# Patient Record
Sex: MALE | Race: WHITE | RURAL
[De-identification: names, ages, dates, MRNs, and addresses within clinical notes are randomized per-mention and may not be internally consistent; named-entity substitution may affect disease eponyms.]

---

## 2017-01-30 ENCOUNTER — OFFICE VISIT (OUTPATIENT)
Dept: FAMILY MEDICINE CLINIC | Age: 22
End: 2017-01-30

## 2017-01-30 VITALS
SYSTOLIC BLOOD PRESSURE: 128 MMHG | OXYGEN SATURATION: 98 % | TEMPERATURE: 97.5 F | DIASTOLIC BLOOD PRESSURE: 84 MMHG | WEIGHT: 248 LBS | HEIGHT: 73 IN | RESPIRATION RATE: 16 BRPM | BODY MASS INDEX: 32.87 KG/M2 | HEART RATE: 80 BPM

## 2017-01-30 DIAGNOSIS — R68.89 FLU-LIKE SYMPTOMS: ICD-10-CM

## 2017-01-30 DIAGNOSIS — J06.9 VIRAL UPPER RESPIRATORY TRACT INFECTION: ICD-10-CM

## 2017-01-30 DIAGNOSIS — M79.10 MYALGIA: ICD-10-CM

## 2017-01-30 DIAGNOSIS — J02.9 SORE THROAT: Primary | ICD-10-CM

## 2017-01-30 DIAGNOSIS — F43.23 ADJUSTMENT DISORDER WITH MIXED ANXIETY AND DEPRESSED MOOD: ICD-10-CM

## 2017-01-30 LAB
BINAX NOW INFLUENZA: NEGATIVE
S PYO AG THROAT QL: NEGATIVE
VALID INTERNAL CONTROL?: YES
VALID INTERNAL CONTROL?: YES

## 2017-01-30 RX ORDER — SERTRALINE HYDROCHLORIDE 50 MG/1
TABLET, FILM COATED ORAL
Qty: 90 TAB | Refills: 3 | Status: SHIPPED | OUTPATIENT
Start: 2017-01-30 | End: 2017-08-18

## 2017-01-30 RX ORDER — PROMETHAZINE HYDROCHLORIDE AND CODEINE PHOSPHATE 6.25; 1 MG/5ML; MG/5ML
5 SOLUTION ORAL
Qty: 120 ML | Refills: 1 | Status: SHIPPED | OUTPATIENT
Start: 2017-01-30 | End: 2017-08-18

## 2017-01-30 RX ORDER — LIDOCAINE HYDROCHLORIDE 20 MG/ML
SOLUTION OROPHARYNGEAL
Qty: 1 BOTTLE | Refills: 1 | Status: SHIPPED | OUTPATIENT
Start: 2017-01-30 | End: 2017-08-18

## 2017-01-30 NOTE — MR AVS SNAPSHOT
Visit Information Date & Time Provider Department Dept. Phone Encounter #  
 1/30/2017 10:15 AM Aileen Royal MD José [address] [place] [address] [place] [id number] Follow-up Instructions Return in about 6 months (around 7/30/2017). Follow-up and Disposition History Upcoming Health Maintenance Date Due DTaP/Tdap/Td series (1 - Tdap) 6/15/2015 INFLUENZA AGE 9 TO ADULT 8/1/2016 Allergies as of 1/30/2017  Review Complete On: 1/30/2017 By: Karin Cardona LPN No Known Allergies Current Immunizations  Never Reviewed No immunizations on file. Not reviewed this visit You Were Diagnosed With   
  
 Codes Comments Sore throat    -  Primary ICD-10-CM: J02.9 ICD-9-CM: 814 Adjustment disorder with mixed anxiety and depressed mood     ICD-10-CM: F43.23 
ICD-9-CM: 309.28 Myalgia     ICD-10-CM: M79.1 ICD-9-CM: 729.1 Flu-like symptoms     ICD-10-CM: R68.89 ICD-9-CM: 780.99 Viral upper respiratory tract infection     ICD-10-CM: J06.9, B97.89 ICD-9-CM: 465.9 Vitals BP Pulse Temp Resp Height(growth percentile) Weight(growth percentile) 128/84 80 97.5 °F (36.4 °C) (Oral) 16 6' 1\" (1.854 m) 248 lb (112.5 kg) SpO2 BMI Smoking Status 98% 32.72 kg/m2 Former Smoker BMI and BSA Data Body Mass Index Body Surface Area 32.72 kg/m 2 2.41 m 2 Preferred Pharmacy Pharmacy Name Phone [id number], [address] [facility] [place] [phone] Your Updated Medication List  
  
   
This list is accurate as of: 1/30/17 12:37 PM.  Always use your most recent med list.  
  
  
  
  
 lidocaine 2 % solution Commonly known as:  XYLOCAINE 5ml in 1 cup warm water, gargle and spit QID prn sore throat  
  
 promethazine-codeine 6.25-10 mg/5 mL syrup Commonly known as:  PHENERGAN with CODEINE Take 5 mL by mouth four (4) times daily as needed for Cough.  Max Daily Amount: 20 mL. sertraline 50 mg tablet Commonly known as:  ZOLOFT  
1/2 tab daily for 6 days, then whole tab daily for nerves  Indications: nerves Prescriptions Printed Refills  
 promethazine-codeine (PHENERGAN WITH CODEINE) 6.25-10 mg/5 mL syrup 1 Sig: Take 5 mL by mouth four (4) times daily as needed for Cough. Max Daily Amount: 20 mL. Class: Print Route: Oral  
  
Prescriptions Sent to Pharmacy Refills  
 sertraline (ZOLOFT) 50 mg tablet 3 Si/2 tab daily for 6 days, then whole tab daily for nerves  Indications: nerves Class: Normal  
 Pharmacy: [facility]/pharmacy #[id number] - [place] - [address] Ph #: [phone]  
 lidocaine (XYLOCAINE) 2 % solution 1 Siml in 1 cup warm water, gargle and spit QID prn sore throat Class: Normal  
 Pharmacy: [facility]/pharmacy #[id number] Abdiaziz Rowe, [address] [place] [address] [address] [place] Ph #: [phone] We Performed the Following AMB POC BINAX NOW INFLUENZA TEST [35262 CPT(R)] AMB POC RAPID STREP A [59816 CPT(R)] Follow-up Instructions Return in about 6 months (around 2017). Introducing [facility]! [employer] introduces Voxa patient portal. Now you can access parts of your medical record, email your doctor's office, and request medication refills online. 1. In your internet browser, go to [email or web address] 2. Click on the First Time User? Click Here link in the Sign In box. You will see the New Member Sign Up page. 3. Enter your Voxa Access Code exactly as it appears below. You will not need to use this code after youve completed the sign-up process. If you do not sign up before the expiration date, you must request a new code. · Voxa Access Code: [email or web address] Expires: 2017 12:37 PM 
 
4.  Enter the last four digits of your Social Security Number (xxxx) and Date of Birth (mm/dd/yyyy) as indicated and click Submit. You will be taken to the next sign-up page. 5. Create a The Paper Store ID. This will be your The Paper Store login ID and cannot be changed, so think of one that is secure and easy to remember. 6. Create a The Paper Store password. You can change your password at any time. 7. Enter your Password Reset Question and Answer. This can be used at a later time if you forget your password. 8. Enter your e-mail address. You will receive e-mail notification when new information is available in [address] 9. Click Sign Up. You can now view and download portions of your medical record. 10. Click the Download Summary menu link to download a portable copy of your medical information. If you have questions, please visit the Frequently Asked Questions section of the The Paper Store website. Remember, The Paper Store is NOT to be used for urgent needs. For medical emergencies, dial 911. Now available from your iPhone and Android! Please provide this summary of care documentation to your next provider. Your primary care clinician is listed as Marce Kwon. If you have any questions after today's visit, please call [phone].

## 2017-01-30 NOTE — PROGRESS NOTES
Ariel Draper is a 24 y.o. male presenting for/with:    Cough; Cold Symptoms; URI; and Sore Throat    HPI:  Above sx since 3d ago. Fatigued. Now with nausea and loose stool off and on. Felt a lot worse this AM, felt like he was very achy this AM all over. Didn't get flu shot this yr. F/U depression. Doing well since last visit. Sleeping well. Still living in apartment in [facility]. Working at [employer] in [place] now, happy with that. Has new job. Planning to r/s [employer] courses this spring or summer. Interests remain normal. Vinnie of zoloft 50 1/2 BID. Energy: good. Concentration: good. Appetite: normal, not increased. Safety: no thoughts of being better off dead lately. PHQ 2 / 9, over the last two weeks 1/30/2017   Little interest or pleasure in doing things Not at all   Feeling down, depressed or hopeless Not at all   Total Score PHQ 2 0     PMH, SH, Medications/Allergies: reviewed, on chart. Drinking sometimes, has 2-3 beers/day. ROS:  Constitutional: No fever, chills or weight loss  Respiratory: No cough, SOB   CV: No chest pain or Palpitations    Visit Vitals    /84    Pulse 80    Temp 97.5 °F (36.4 °C) (Oral)    Resp 16    Ht 6' 1\" (1.854 m)    Wt 248 lb (112.5 kg)    SpO2 98%    BMI 32.72 kg/m2     Wt Readings from Last 3 Encounters:   01/30/17 248 lb (112.5 kg)   07/21/16 240 lb (108.9 kg)   06/01/16 238 lb (108 kg)     Physical Examination: General appearance - alert, well appearing, and in no distress  Mental status - alert, oriented to person, place, and time  Eyes - pupils equal and reactive, extraocular eye movements intact  ENT - bilateral external ears and nose normal. Normal lips. OP with mod erythema and mildly enlarged tonsils. Neck - supple, no significant adenopathy, no thyromegaly or mass  CV: RRR no M/R/G. Nl PMI. Resp: LCTA B, nl resp effort. Psychiatric: Mood: \"good;\" Affect: variable, appropriate;  Thoughts: logical; Speech: RRR; Sensorium: No AV hallucinations; Safety: no SI/HI    Labs:  Strep: NEG  Flu: NEG    A/P:   Flu like symptoms  Tests reassuring. Monitor, OTC analgesics PRN. Phenergan AC PRN cough. Lidocaine gargle PRN sore throat. Adjustment disorder with mixed affect, well controlled. Con't zoloft 25mg BID.      F/U 6mo

## 2017-08-18 ENCOUNTER — OFFICE VISIT (OUTPATIENT)
Dept: FAMILY MEDICINE CLINIC | Age: 22
End: 2017-08-18

## 2017-08-18 VITALS
BODY MASS INDEX: 33.48 KG/M2 | TEMPERATURE: 98.2 F | HEART RATE: 64 BPM | RESPIRATION RATE: 17 BRPM | WEIGHT: 253.8 LBS | SYSTOLIC BLOOD PRESSURE: 120 MMHG | DIASTOLIC BLOOD PRESSURE: 75 MMHG | OXYGEN SATURATION: 98 %

## 2017-08-18 DIAGNOSIS — F43.23 ADJUSTMENT DISORDER WITH MIXED ANXIETY AND DEPRESSED MOOD: Primary | ICD-10-CM

## 2017-08-18 NOTE — MR AVS SNAPSHOT
Visit Information Date & Time Provider Department Dept. Phone Encounter #  
 8/18/2017  1:40 PM Lakisha Haque MD [facility] [place] [id number] Follow-up Instructions Return if symptoms worsen or fail to improve. Follow-up and Disposition History Upcoming Health Maintenance Date Due INFLUENZA AGE 9 TO ADULT 12/15/2017* DTaP/Tdap/Td series (2 - Td) 8/18/2027 *Topic was postponed. The date shown is not the original due date. Allergies as of 8/18/2017  Review Complete On: 8/18/2017 By: Lakisha Haque MD  
 No Known Allergies Current Immunizations  Reviewed on 8/18/2017 No immunizations on file. Reviewed by Gaviota Baker on 8/18/2017 at  1:39 PM  
You Were Diagnosed With   
  
 Codes Comments Adjustment disorder with mixed anxiety and depressed mood    -  Primary ICD-10-CM: F43.23 
ICD-9-CM: 309.28 Vitals BP Pulse Temp Resp Weight(growth percentile) SpO2  
 120/75 64 98.2 °F (36.8 °C) (Oral) 17 253 lb 12.8 oz (115.1 kg) 98% BMI Smoking Status 33.48 kg/m2 Former Smoker Vitals History BMI and BSA Data Body Mass Index Body Surface Area  
 33.48 kg/m 2 2.43 m 2 Preferred Pharmacy [facility] Name Phone [id number] [address] [place] [phone] Your Updated Medication List  
  
Notice  As of 8/18/2017  2:24 PM  
 You have not been prescribed any medications. Follow-up Instructions Return if symptoms worsen or fail to improve. Introducing [facility]! Zaida Grant introduces Cympel patient portal. Now you can access parts of your medical record, email your doctor's office, and request medication refills online. 1. In your internet browser, go to [email or web address] 2. Click on the First Time User? Click Here link in the Sign In box.  You will see the New Member Sign Up page. 3. Enter your XMPie Access Code exactly as it appears below. You will not need to use this code after youve completed the sign-up process. If you do not sign up before the expiration date, you must request a new code. · XMPie Access Code: [email or web address] Expires: 11/16/2017  1:29 PM 
 
4. Enter the last four digits of your Social Security Number (xxxx) and Date of Birth (mm/dd/yyyy) as indicated and click Submit. You will be taken to the next sign-up page. 5. Create a Eventus Software Pvtt ID. This will be your XMPie login ID and cannot be changed, so think of one that is secure and easy to remember. 6. Create a XMPie password. You can change your password at any time. 7. Enter your Password Reset Question and Answer. This can be used at a later time if you forget your password. 8. Enter your e-mail address. You will receive e-mail notification when new information is available in [address] 9. Click Sign Up. You can now view and download portions of your medical record. 10. Click the Download Summary menu link to download a portable copy of your medical information. If you have questions, please visit the Frequently Asked Questions section of the XMPie website. Remember, XMPie is NOT to be used for urgent needs. For medical emergencies, dial 911. Now available from your iPhone and Android! Please provide this summary of care documentation to your next provider. Your primary care clinician is listed as Antony Kwon. If you have any questions after today's visit, please call [phone].

## 2017-08-18 NOTE — PROGRESS NOTES
Heather Skaggs is a 20 y.o. male presenting for/with:    Muscle Pain (Neck pain, headache, muscles aches all over body. )    HPI:  Above sx since 5d ago, woke up sick. Took a break. Better with APAP, advil. Improved after a couple days. Mostly back to normal, just needs clearance to return to work. F/U depression. Doing well since last visit. Sleeping well. Still living in apartment in [place]. Still working at [employer] in [facility] now, still happy with that. Planning to r/s [employer] correspondence courses this spring or summer. Interests remain normal. Off zoloft now. Concentration: good. Appetite: normal, not increased. Safety: no thoughts of being better off dead lately. PHQ over the last two weeks 8/18/2017   PHQ Not Done -   Little interest or pleasure in doing things Not at all   Feeling down, depressed or hopeless Not at all   Total Score PHQ 2 0     PMH, SH, Medications/Allergies: reviewed, on chart. Drinking sometimes, has 2-3 beers/day. ROS:  Constitutional: No fever, chills or weight loss  Respiratory: No cough, SOB   CV: No chest pain or Palpitations    Visit Vitals    /75    Pulse 64    Temp 98.2 °F (36.8 °C) (Oral)    Resp 17    Wt 253 lb 12.8 oz (115.1 kg)    SpO2 98%    BMI 33.48 kg/m2     Wt Readings from Last 3 Encounters:   08/18/17 253 lb 12.8 oz (115.1 kg)   01/30/17 248 lb (112.5 kg)   07/21/16 240 lb (108.9 kg)     Physical Examination: General appearance - alert, well appearing, and in no distress  Mental status - alert, oriented to person, place, and time  Eyes - pupils equal and reactive, extraocular eye movements intact  ENT - bilateral external ears and nose normal. Normal lips. OP with mod erythema and mildly enlarged tonsils. Neck - supple, no significant adenopathy, no thyromegaly or mass  CV: RRR no M/R/G. Nl PMI. Resp: LCTA B, nl resp effort. Psychiatric: Mood: \"good;\" Affect: variable, appropriate;  Thoughts: logical; Speech: RRR; Sensorium: No AV hallucinations; Safety: no SI/HI    A/P:   URI  Resolved. Fit for full duty    Adjustment disorder with mixed affect, well controlled. Resolved. Monitor.      F/U PRN

## 2017-08-18 NOTE — LETTER
8/18/2017 2:20 PM 
Brittaney Camargo MD 
[address] [place] [address],[address] [address] [phone] Re: Mr. Ade Starr [facility] [address] PT was seen in office today and is fit for full duty. Sincerely, Brittaney Camargo MD

## 2018-03-01 ENCOUNTER — OFFICE VISIT (OUTPATIENT)
Dept: FAMILY MEDICINE CLINIC | Age: 23
End: 2018-03-01

## 2018-03-01 VITALS
BODY MASS INDEX: 33.32 KG/M2 | OXYGEN SATURATION: 98 % | RESPIRATION RATE: 19 BRPM | SYSTOLIC BLOOD PRESSURE: 145 MMHG | WEIGHT: 251.4 LBS | DIASTOLIC BLOOD PRESSURE: 92 MMHG | TEMPERATURE: 98.2 F | HEART RATE: 62 BPM | HEIGHT: 73 IN

## 2018-03-01 DIAGNOSIS — R09.81 SINUS CONGESTION: ICD-10-CM

## 2018-03-01 DIAGNOSIS — J11.1 INFLUENZA: Primary | ICD-10-CM

## 2018-03-01 RX ORDER — DEXAMETHASONE 6 MG/1
TABLET ORAL
Qty: 2 TAB | Refills: 0 | Status: SHIPPED | OUTPATIENT
Start: 2018-03-01 | End: 2020-03-03 | Stop reason: ALTCHOICE

## 2018-03-01 RX ORDER — OSELTAMIVIR PHOSPHATE 75 MG/1
75 CAPSULE ORAL 2 TIMES DAILY
Qty: 10 CAP | Refills: 0 | Status: SHIPPED | OUTPATIENT
Start: 2018-03-01 | End: 2018-03-06

## 2018-03-01 NOTE — PATIENT INSTRUCTIONS

## 2018-03-01 NOTE — MR AVS SNAPSHOT
Kaycee Craig 
 
 
 [address] [facility] [phone] Patient: Bhumi Song MRN: [id number] FAS:[birth year] Visit Information Date & Time Provider Department Dept. Phone Encounter #  
 3/1/2018  9:30 AM Alexia Hill NP [address] [place] [id number] Follow-up Instructions Return if symptoms worsen or fail to improve. Follow-up and Disposition History Upcoming Health Maintenance Date Due Influenza Age 5 to Adult 8/1/2017 DTaP/Tdap/Td series (2 - Td) 8/18/2027 Allergies as of 3/1/2018  Review Complete On: 3/1/2018 By: Alexia Hill NP No Known Allergies Current Immunizations  Reviewed on 8/18/2017 No immunizations on file. Not reviewed this visit You Were Diagnosed With   
  
 Codes Comments Influenza    -  Primary ICD-10-CM: J11.1 ICD-9-CM: 732.8 Sinus congestion     ICD-10-CM: R09.81 ICD-9-CM: 478.19 Vitals BP Pulse Temp Resp Height(growth percentile) Weight(growth percentile) (!) 145/92 (BP 1 Location: Left arm, BP Patient Position: Sitting) 62 98.2 °F (36.8 °C) (Oral) 19 6' 1\" (1.854 m) 251 lb 6.4 oz (114 kg) SpO2 BMI Smoking Status 98% 33.17 kg/m2 Former Smoker BMI and BSA Data Body Mass Index Body Surface Area  
 33.17 kg/m 2 2.42 m 2 Preferred Pharmacy Pharmacy Name Phone [id number] Lynda Vera [facility] [place] [phone] Your Updated Medication List  
  
   
This list is accurate as of 3/1/18 11:51 AM.  Always use your most recent med list.  
  
  
  
  
 dexamethasone 6 mg tablet Commonly known as:  DECADRON Take 2 tabs PO today with food  
  
 oseltamivir 75 mg capsule Commonly known as:  TAMIFLU Take 1 Cap by mouth two (2) times a day for 5 days. Indications: INFLUENZA Prescriptions Sent to Pharmacy Refills dexamethasone (DECADRON) 6 mg tablet 0 Sig: Take 2 tabs PO today with food Class: Normal  
 Pharmacy: [facility]/pharmacy #[id number] - [place] [address] [place] Ph #: [phone]  
 oseltamivir (TAMIFLU) 75 mg capsule 0 Sig: Take 1 Cap by mouth two (2) times a day for 5 days. Indications: INFLUENZA Class: Normal  
 Pharmacy: [facility]/pharmacy #[id number] [address] [place] Ph #: [phone] Route: Oral  
  
Follow-up Instructions Return if symptoms worsen or fail to improve. Patient Instructions Influenza (Flu): Care Instructions Your Care Instructions Influenza (flu) is an infection in the lungs and breathing passages. It is caused by the influenza virus. There are different strains, or types, of the flu virus from year to year. Unlike the common cold, the flu comes on suddenly and the symptoms, such as a cough, congestion, fever, chills, fatigue, aches, and pains, are more severe. These symptoms may last up to 10 days. Although the flu can make you feel very sick, it usually doesn't cause serious health problems. Home treatment is usually all you need for flu symptoms. But your doctor may prescribe antiviral medicine to prevent other health problems, such as pneumonia, from developing. Older people and those who have a long-term health condition, such as lung disease, are most at risk for having pneumonia or other health problems. Follow-up care is a key part of your treatment and safety. Be sure to make and go to all appointments, and call your doctor if you are having problems. It's also a good idea to know your test results and keep a list of the medicines you take. How can you care for yourself at home? · Get plenty of rest. 
· Drink plenty of fluids, enough so that your urine is light yellow or clear like water.  If you have kidney, heart, or liver disease and have to limit fluids, talk with your doctor before you increase the amount of fluids you drink. · Take an over-the-counter pain medicine if needed, such as acetaminophen (Tylenol), ibuprofen (Advil, Motrin), or naproxen (Aleve), to relieve fever, headache, and muscle aches. Read and follow all instructions on the label. No one younger than 20 should take aspirin. It has been linked to Reye syndrome, a serious illness. · Do not smoke. Smoking can make the flu worse. If you need help quitting, talk to your doctor about stop-smoking programs and medicines. These can increase your chances of quitting for good. · Breathe moist air from a hot shower or from a sink filled with hot water to help clear a stuffy nose. · Before you use cough and cold medicines, check the label. These medicines may not be safe for young children or for people with certain health problems. · If the skin around your nose and lips becomes sore, put some petroleum jelly on the area. · To ease coughing: ¨ Drink fluids to soothe a scratchy throat. ¨ Suck on cough drops or plain hard candy. ¨ Take an over-the-counter cough medicine that contains dextromethorphan to help you get some sleep. Read and follow all instructions on the label. ¨ Raise your head at night with an extra pillow. This may help you rest if coughing keeps you awake. · Take any prescribed medicine exactly as directed. Call your doctor if you think you are having a problem with your medicine. To avoid spreading the flu · Wash your hands regularly, and keep your hands away from your face. · Stay home from school, work, and other public places until you are feeling better and your fever has been gone for at least 24 hours. The fever needs to have gone away on its own without the help of medicine. · Ask people living with you to talk to their doctors about preventing the flu. They may get antiviral medicine to keep from getting the flu from you. · To prevent the flu in the future, get a flu vaccine every fall. Encourage people living with you to get the vaccine. · Cover your mouth when you cough or sneeze. When should you call for help? Call 911 anytime you think you may need emergency care. For example, call if: 
? · You have severe trouble breathing. ?Call your doctor now or seek immediate medical care if: 
? · You have new or worse trouble breathing. ? · You seem to be getting much sicker. ? · You feel very sleepy or confused. ? · You have a new or higher fever. ? · You get a new rash. ? Watch closely for changes in your health, and be sure to contact your doctor if: 
? · You begin to get better and then get worse. ? · You are not getting better after 1 week. Where can you learn more? Go to http://malgorzata-mikael.info/. Enter B527 in the search box to learn more about \"Influenza (Flu): Care Instructions. \" Current as of: May 12, 2017 Content Version: 11.4 © 0500-0264 Acsis. Care instructions adapted under license by [employer] (which disclaims liability or warranty for this information). If you have questions about a medical condition or this instruction, always ask your healthcare professional. [address] any warranty or liability for your use of this information. If you have any questions regarding Veodia, you may call Veodia support at [phone]. Patient Instructions History Introducing [facility]! [facility] introduces [email or web address] patient portal. Now you can access parts of your medical record, email your doctor's office, and request medication refills online. 1. In your internet browser, go to [email or web address] 2. Click on the First Time User? Click Here link in the Sign In box. You will see the New Member Sign Up page. 3. Enter your [email or web address] Access Code exactly as it appears below.  You will not need to use this code after youve completed the sign-up process. If you do not sign up before the expiration date, you must request a new code. · Podimetrics Access Code: [email or web address] Expires: 5/30/2018 11:51 AM 
 
4. Enter the last four digits of your Social Security Number (xxxx) and Date of Birth (mm/dd/yyyy) as indicated and click Submit. You will be taken to the next sign-up page. 5. Create a Podimetrics ID. This will be your Podimetrics login ID and cannot be changed, so think of one that is secure and easy to remember. 6. Create a Podimetrics password. You can change your password at any time. 7. Enter your Password Reset Question and Answer. This can be used at a later time if you forget your password. 8. Enter your e-mail address. You will receive e-mail notification when new information is available in [address] 9. Click Sign Up. You can now view and download portions of your medical record. 10. Click the Download Summary menu link to download a portable copy of your medical information. If you have questions, please visit the Frequently Asked Questions section of the Podimetrics website. Remember, Podimetrics is NOT to be used for urgent needs. For medical emergencies, dial 911. Now available from your iPhone and Android! Please provide this summary of care documentation to your next provider. Your primary care clinician is listed as Celestino Kwon. If you have any questions after today's visit, please call [phone].

## 2018-03-01 NOTE — LETTER
NOTIFICATION RETURN TO WORK / SCHOOL 
 
3/1/2018 11:48 AM 
 
Mr. Carroll Chavarria [facility] 
[place] [address] To Whom It May Concern: 
 
Carroll Chavarria is currently under the care of Philomena Wolf. He will return to work/school on: 3/5/18 If there are questions or concerns please have the patient contact our office. Sincerely, Susannah Cameron NP

## 2018-03-01 NOTE — PROGRESS NOTES
Chief Complaint   Patient presents with    Flu     muscle aches, sore throat, headache, fatigue for the past 2 days. HPI:      Mark Zaman is a 20 y.o. male. New Issues:  He has had muscle aches, sore throat, headache and fatigue for the past 2 days. Had some chills, but not currently. No nausea. Did not get a flu shot. Has been around multiple patients with the flu. No Known Allergies    No current outpatient prescriptions on file. No current facility-administered medications for this visit. Past Medical History:   Diagnosis Date    Depression        No past surgical history on file. Social History     Social History    Marital status: SINGLE     Spouse name: N/A    Number of children: N/A    Years of education: N/A     Social History Main Topics    Smoking status: Former Smoker    Smokeless tobacco: Never Used    Alcohol use Yes      Comment: socially    Drug use: None    Sexual activity: Not Asked     Other Topics Concern    None     Social History Narrative       No family history on file. Above history reviewed. ROS:  Denies fever, chills, cough, chest pain, SOB,  nausea, vomiting, or diarrhea. Denies wt loss, wt gain, hemoptysis, hematochezia or melena. Physical Examination:    BP (!) 145/92 (BP 1 Location: Left arm, BP Patient Position: Sitting)  Pulse 62  Temp 98.2 °F (36.8 °C) (Oral)   Resp 19  Ht 6' 1\" (1.854 m)  Wt 251 lb 6.4 oz (114 kg)  SpO2 98%  BMI 33.17 kg/m2    General: Alert and Ox3, Fluent speech  Neck:  Supple, no adenopathy, JVD, mass or bruit  Chest:  Clear to Ausculation, without wheezes, rales, rubs or ronchi  Cardiac: RRR  Extremities:  No cyanosis, clubbing or edema  Neurologic:  Ambulatory without assist, CN 2-12 grossly intact. Moves all extremities. Skin: no rash  Lymphadenopathy: no cervical or supraclavicular nodes    ASSESSMENT AND PLAN:     1.  Influenza  Unable to test rapid flu s/t shortage of flu swabs  Symptoms are viral. Discussed recommendation to avoid antibiotic. Reviewed self care measures:  Fluids  Nasal Saline  Humidification + menthol petroleum (Vicks.)  Postural drainage  NSAID of choice PRN  Avoid decongestants, too drying and difficult to clear respiratory secretions. - dexamethasone (DECADRON) 6 mg tablet; Take 2 tabs PO today with food  Dispense: 2 Tab; Refill: 0  - oseltamivir (TAMIFLU) 75 mg capsule; Take 1 Cap by mouth two (2) times a day for 5 days. Indications: INFLUENZA  Dispense: 10 Cap; Refill: 0    2. Sinus congestion  Take with food. - dexamethasone (DECADRON) 6 mg tablet; Take 2 tabs PO today with food  Dispense: 2 Tab;  Refill: 0     RTC PRN    Eloy Huitron NP

## 2020-09-01 ENCOUNTER — OFFICE VISIT (OUTPATIENT)
Dept: PRIMARY CARE CLINIC | Age: 25
End: 2020-09-01

## 2020-09-01 DIAGNOSIS — Z20.828 EXPOSURE TO SARS-ASSOCIATED CORONAVIRUS: Primary | ICD-10-CM

## 2020-09-03 LAB — SARS-COV-2, NAA: NOT DETECTED

## 2021-08-11 ENCOUNTER — OFFICE VISIT (OUTPATIENT)
Dept: FAMILY MEDICINE CLINIC | Age: 26
End: 2021-08-11
Payer: COMMERCIAL

## 2021-08-11 VITALS
BODY MASS INDEX: 26.6 KG/M2 | SYSTOLIC BLOOD PRESSURE: 122 MMHG | HEART RATE: 76 BPM | HEIGHT: 74 IN | WEIGHT: 207.31 LBS | RESPIRATION RATE: 20 BRPM | DIASTOLIC BLOOD PRESSURE: 78 MMHG | TEMPERATURE: 97.2 F | OXYGEN SATURATION: 99 %

## 2021-08-11 DIAGNOSIS — Z51.81 THERAPEUTIC DRUG MONITORING: ICD-10-CM

## 2021-08-11 DIAGNOSIS — Z13.1 SCREENING FOR DIABETES MELLITUS: ICD-10-CM

## 2021-08-11 DIAGNOSIS — Z00.00 WELL ADULT EXAM: Primary | ICD-10-CM

## 2021-08-11 DIAGNOSIS — Z13.220 SCREENING FOR LIPOID DISORDERS: ICD-10-CM

## 2021-08-11 DIAGNOSIS — F90.0 ADHD (ATTENTION DEFICIT HYPERACTIVITY DISORDER), INATTENTIVE TYPE: ICD-10-CM

## 2021-08-11 PROBLEM — F98.8 ATTENTION DEFICIT DISORDER (ADD) IN ADULT: Status: ACTIVE | Noted: 2021-08-11

## 2021-08-11 PROCEDURE — 99395 PREV VISIT EST AGE 18-39: CPT | Performed by: FAMILY MEDICINE

## 2021-08-11 RX ORDER — METHYLPHENIDATE HYDROCHLORIDE 5 MG/1
5 TABLET ORAL DAILY
Qty: 30 TABLET | Refills: 0 | Status: SHIPPED | OUTPATIENT
Start: 2021-08-11 | End: 2021-09-15 | Stop reason: SDUPTHER

## 2021-08-11 NOTE — PROGRESS NOTES
Chief Complaint   Patient presents with    Behavioral Problem     HPI:    Bard Ley is a 31 y.o. male. HPI:  Pt here for wellness visit, but also having more problems with class work. Has had for years, would often have problems paying attention in class as schoolchild, but pushed though. 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 ADHD  Inattention (6 or more)  YES Often fails to pay close attention to details or makes careless mistakes in schoolwork or other activities  YES Often has trouble sustaining attention during tasks or play  YES Seems not to listen even when spoken to directly  YES Has difficulty following through on instructions and often fails to finish schoolwork, chores or other tasks  YES Often has problems organizing tasks or activities  YES Avoids or dislikes tasks that require sustained mental effort, such as schoolwork or homework  NO Frequently loses needed items, such as books, pencils, toys or tools  YES Can be easily distracted  YES Often forgetful    Hyperactive/ Impulsive (6 or more)  YES Fidgets or squirms frequently  YES Often leaves his or her seat in the classroom or in other situations when remaining seated is expected  NO Often runs or climbs excessively when it's not appropriate or, if an adolescent, feelings of restlessnessNO Difficulty playing quietly  NO Driven by a motor/Always on the go  NO Talks excessively  NO Blurts out the answers before questions have been completely asked  NO Frequently has difficulty waiting for his or her turn  NO Often interrupts or intrudes on others      PMH, SH, Medications/Allergies: reviewed, on chart. Current Outpatient Medications   Medication Sig    methylphenidate HCl (RITALIN) 5 mg tablet Take 1 Tablet by mouth daily for 30 days. Max Daily Amount: 5 mg.     Cetirizine (ZYRTEC) 10 mg cap Take  by mouth. No current facility-administered medications for this visit. ROS:  Constitutional: No fever, chills or abnormal weight loss  Respiratory: No cough, SOB   CV: No chest pain or Palpitations    Physical Examination:    /78   Pulse 76   Temp 97.2 °F (36.2 °C) (Temporal)   Resp 20   Ht 6' 2\" (1.88 m)   Wt 207 lb 5 oz (94 kg)   SpO2 99%   BMI 26.62 kg/m²   BP Readings from Last 3 Encounters:   08/11/21 122/78   12/14/20 130/80   03/03/20 112/60     Wt Readings from Last 3 Encounters:   08/11/21 207 lb 5 oz (94 kg)   12/14/20 210 lb 9.6 oz (95.5 kg)   03/03/20 214 lb (97.1 kg)   -3    General: Alert and Ox3, Fluent speech  Neck:  Supple, no adenopathy, JVD, mass or bruit  Chest:  Clear to Ausculation, without wheezes, rales, rubs or ronchi  Cardiac: RRR  Extremities:  No cyanosis, clubbing or edema  Neurologic:  Ambulatory without assist, CN 2-12 grossly intact. Moves all extremities.   Skin: no rash  Lymphadenopathy: no cervical or supraclavicular nodes    Lab Results   Component Value Date/Time    Cholesterol, total 199 08/26/2019 09:40 AM    HDL Cholesterol 56 08/26/2019 09:40 AM    LDL, calculated 120 (H) 08/26/2019 09:40 AM    VLDL, calculated 23 08/26/2019 09:40 AM    Triglyceride 116 08/26/2019 09:40 AM     No results found for: GLU, GLUCPOC    3 most recent PHQ Screens 8/11/2021   PHQ Not Done -   Little interest or pleasure in doing things Not at all   Feeling down, depressed, irritable, or hopeless Not at all   Total Score PHQ 2 0   Trouble falling or staying asleep, or sleeping too much -   Feeling tired or having little energy -   Poor appetite, weight loss, or overeating -   Feeling bad about yourself - or that you are a failure or have let yourself or your family down -   Trouble concentrating on things such as school, work, reading, or watching TV -   Moving or speaking so slowly that other people could have noticed; or the opposite being so fidgety that others notice -   Thoughts of being better off dead, or hurting yourself in some way -   PHQ 9 Score -   How difficult have these problems made it for you to do your work, take care of your home and get along with others -     ASSESSMENT AND PLAN:     Wellness visit  Check lipids and glucose levels. Vaccines due: flu shot this fall. Attention deficit, youth onset  Worsening sx. Discussed options, med ADR/SE's. Start ritalin 5mg qam and titrate PRN.     RTC 3m PRN

## 2021-08-14 LAB
AMPHETAMINES UR QL SCN: NEGATIVE NG/ML
BARBITURATES UR QL SCN: NEGATIVE NG/ML
BENZODIAZ UR QL: NEGATIVE NG/ML
BZE UR QL: NEGATIVE NG/ML
CANNABINOIDS UR QL SCN: NEGATIVE NG/ML
CHOLEST SERPL-MCNC: 186 MG/DL (ref 100–199)
GLUCOSE SERPL-MCNC: 87 MG/DL (ref 65–99)
HDLC SERPL-MCNC: 73 MG/DL
LDLC SERPL CALC-MCNC: 99 MG/DL (ref 0–99)
OPIATES UR QL: NEGATIVE NG/ML
PCP UR QL: NEGATIVE NG/ML
TRIGL SERPL-MCNC: 74 MG/DL (ref 0–149)
VLDLC SERPL CALC-MCNC: 14 MG/DL (ref 5–40)

## 2021-08-16 LAB
CHOLEST SERPL-MCNC: 186 MG/DL (ref 100–199)
GLUCOSE SERPL-MCNC: 87 MG/DL (ref 65–99)
HDLC SERPL-MCNC: 73 MG/DL
LDLC SERPL CALC-MCNC: 99 MG/DL (ref 0–99)
TRIGL SERPL-MCNC: 74 MG/DL (ref 0–149)
VLDLC SERPL CALC-MCNC: 14 MG/DL (ref 5–40)

## 2021-08-23 ENCOUNTER — VIRTUAL VISIT (OUTPATIENT)
Dept: FAMILY MEDICINE CLINIC | Age: 26
End: 2021-08-23
Payer: COMMERCIAL

## 2021-08-23 DIAGNOSIS — J06.9 VIRAL UPPER RESPIRATORY TRACT INFECTION: Primary | ICD-10-CM

## 2021-08-23 DIAGNOSIS — Z20.828 CONTACT WITH AND (SUSPECTED) EXPOSURE TO OTHER VIRAL COMMUNICABLE DISEASES: ICD-10-CM

## 2021-08-23 PROCEDURE — 99442 PR PHYS/QHP TELEPHONE EVALUATION 11-20 MIN: CPT | Performed by: FAMILY MEDICINE

## 2021-08-23 RX ORDER — PREDNISONE 20 MG/1
20 TABLET ORAL
Qty: 4 TABLET | Refills: 0 | Status: SHIPPED | OUTPATIENT
Start: 2021-08-23 | End: 2021-08-27

## 2021-08-23 NOTE — PROGRESS NOTES
Learning Assessment 8/11/2021   PRIMARY LEARNER Patient   PRIMARY LANGUAGE ENGLISH   LEARNER PREFERENCE PRIMARY DEMONSTRATION   ANSWERED BY patient   RELATIONSHIP SELF     1. Have you been to the ER, urgent care clinic since your last visit? Hospitalized since your last visit? No    2. Have you seen or consulted any other health care providers outside of the [address] [place] [address] since your last visit? Include any pap smears or colon screening.  No

## 2021-08-23 NOTE — PROGRESS NOTES
Cassia Ibrahim is a 31 y.o. male who was seen by synchronous (real-time) audio technology on 8/23/2021. Pt was seen at home. Provider was at the office. No other participants in this encounter. Subjective:   Sinus Infection    HPI:  Symptoms include sinus pressure wtihout fever, rhinorrhea, headache, cough, chest congestion. No recent covid test or known exposure. Onset of symptoms was 7 days ago, stable since that time. Evaluation to date: none. Treatment to date: antihistamines, dayquil, tylenol flu, helps a little    PMH, SH, Medications/Allergies: reviewed, on chart. Current Outpatient Medications   Medication Sig    methylphenidate HCl (RITALIN) 5 mg tablet Take 1 Tablet by mouth daily for 30 days. Max Daily Amount: 5 mg.  Cetirizine (ZYRTEC) 10 mg cap Take  by mouth. No current facility-administered medications for this visit. No Known Allergies    ROS:  Constitutional: No fever, chills or abnormal weight loss  Respiratory: POS cough, no SOB   CV: No chest pain or Palpitations    VS review: Wt Readings from Last 3 Encounters:   08/11/21 207 lb 5 oz (94 kg)   12/14/20 210 lb 9.6 oz (95.5 kg)   03/03/20 214 lb (97.1 kg)     BP Readings from Last 3 Encounters:   08/11/21 122/78   12/14/20 130/80   03/03/20 112/60       Objective:     General: alert, cooperative, no distress   Mental  status: mental status: alert, oriented to person, place, and time, normal mood, behavior, speech, dress, motor activity, and thought processes   Resp: resp: normal effort and no respiratory distress   Neuro: neuro: no gross deficits         Assessment & Plan:     URI sx vs allergy sx  Has had covid vax, so less likely to be COVID, but possible. rec covid testing, would like testing at [facility], pt will arrange. Tx with prednisone 20mg/d x4d. Quarantine until results come back NEG.     Time-based coding, delete if not needed: I spent at least 15 minutes with this established patient, and >50% of the time was spent counseling and/or coordinating care regarding care plan and expected course  Timothy Strickland MD    Due to this being a TeleHealth evaluation, many elements of the physical examination are unable to be assessed. We discussed the expected course, resolution and complications of the diagnosis(es) in detail. Medication risks, benefits, costs, interactions, and alternatives were discussed as indicated. I advised him to contact the office if his condition worsens, changes or fails to improve as anticipated. He expressed understanding with the diagnosis(es) and plan. Pursuant to the emergency declaration under the [facility], [facility] waiver authority and the [employer] Act, this Virtual  Visit was conducted, with patient's consent, to reduce the patient's risk of exposure to COVID-19 and provide continuity of care for an established patient. Services were provided through audio synchronous discussion virtually to substitute for in-person clinic visit. Consent:  He and/or his healthcare decision maker is aware that this patient-initiated Telehealth encounter is a billable service, with coverage as determined by his insurance carrier. He is aware that he may receive a bill and has provided verbal consent to proceed.     CPT Codes 09493-59451 for Established Patients may apply to this Telehealth Visit

## 2021-08-23 NOTE — PATIENT INSTRUCTIONS
Viral Respiratory Infection: Care Instructions  Your Care Instructions     Viruses are very small organisms. They grow in number after they enter your body. There are many types that cause different illnesses, such as colds and the mumps. The symptoms of a viral respiratory infection often start quickly. They include a fever, sore throat, and runny nose. You may also just not feel well. Or you may not want to eat much. Most viral respiratory infections are not serious. They usually get better with time and self-care. Antibiotics are not used to treat a viral infection. That's because antibiotics will not help cure a viral illness. In some cases, antiviral medicine can help your body fight a serious viral infection. Follow-up care is a key part of your treatment and safety. Be sure to make and go to all appointments, and call your doctor if you are having problems. It's also a good idea to know your test results and keep a list of the medicines you take. How can you care for yourself at home? · Rest as much as possible until you feel better. · Be safe with medicines. Take your medicine exactly as prescribed. Call your doctor if you think you are having a problem with your medicine. You will get more details on the specific medicine your doctor prescribes. · Take an over-the-counter pain medicine, such as acetaminophen (Tylenol), ibuprofen (Advil, Motrin), or naproxen (Aleve), as needed for pain and fever. Read and follow all instructions on the label. Do not give aspirin to anyone younger than 20. It has been linked to Reye syndrome, a serious illness. · Drink plenty of fluids. Hot fluids, such as tea or soup, may help relieve congestion in your nose and throat. If you have kidney, heart, or liver disease and have to limit fluids, talk with your doctor before you increase the amount of fluids you drink. · Try to clear mucus from your lungs by breathing deeply and coughing.   · Gargle with warm salt water once an hour. This can help reduce swelling and throat pain. Use 1 teaspoon of salt mixed in 1 cup of warm water. · Do not smoke or allow others to smoke around you. If you need help quitting, talk to your doctor about stop-smoking programs and medicines. These can increase your chances of quitting for good. To avoid spreading the virus  · Cough or sneeze into a tissue. Then throw the tissue away. · If you don't have a tissue, use your hand to cover your cough or sneeze. Then clean your hand. You can also cough into your sleeve. · Wash your hands often. Use soap and warm water. Wash for 15 to 20 seconds each time. · If you don't have soap and water near you, you can clean your hands with alcohol wipes or gel. When should you call for help? Call your doctor now or seek immediate medical care if:    · You have a new or higher fever.     · Your fever lasts more than 48 hours.     · You have trouble breathing.     · You have a fever with a stiff neck or a severe headache.     · You are sensitive to light.     · You feel very sleepy or confused. Watch closely for changes in your health, and be sure to contact your doctor if:    · You do not get better as expected. Where can you learn more? Go to http://www.gray.com/  Enter Q795 in the search box to learn more about \"Viral Respiratory Infection: Care Instructions. \"  Current as of: October 26, 2020               Content Version: 12.8  © 0650-2000 [employer]. Care instructions adapted under license by [employer] (which disclaims liability or warranty for this information). If you have questions about a medical condition or this instruction, always ask your healthcare professional. [place] [address] any warranty or liability for your use of this information.

## 2021-09-15 DIAGNOSIS — F98.8 ATTENTION DEFICIT DISORDER (ADD) IN ADULT: Primary | ICD-10-CM

## 2021-09-15 DIAGNOSIS — F90.0 ADHD (ATTENTION DEFICIT HYPERACTIVITY DISORDER), INATTENTIVE TYPE: ICD-10-CM

## 2021-09-15 RX ORDER — METHYLPHENIDATE HYDROCHLORIDE 5 MG/1
5 TABLET ORAL DAILY
Qty: 30 TABLET | Refills: 0 | Status: SHIPPED | OUTPATIENT
Start: 2021-10-15 | End: 2021-11-10 | Stop reason: SDUPTHER

## 2021-09-15 RX ORDER — METHYLPHENIDATE HYDROCHLORIDE 5 MG/1
5 TABLET ORAL DAILY
Qty: 30 TABLET | Refills: 0 | Status: SHIPPED | OUTPATIENT
Start: 2021-09-15 | End: 2021-11-10 | Stop reason: SDUPTHER

## 2021-09-15 NOTE — PROGRESS NOTES
[place]  reviewed. Fill pattern in goal. Will fill for 2 add'l months and arrange virtual visit for [address]. PT needs to do a couple of home blood pressure checks in the meantime.

## 2021-10-15 DIAGNOSIS — F90.0 ADHD (ATTENTION DEFICIT HYPERACTIVITY DISORDER), INATTENTIVE TYPE: ICD-10-CM

## 2021-10-15 DIAGNOSIS — F98.8 ATTENTION DEFICIT DISORDER (ADD) IN ADULT: ICD-10-CM

## 2021-10-15 RX ORDER — METHYLPHENIDATE HYDROCHLORIDE 5 MG/1
5 TABLET ORAL DAILY
Qty: 30 TABLET | Refills: 0 | Status: CANCELLED | OUTPATIENT
Start: 2021-10-15 | End: 2021-11-14

## 2021-11-10 ENCOUNTER — VIRTUAL VISIT (OUTPATIENT)
Dept: FAMILY MEDICINE CLINIC | Age: 26
End: 2021-11-10
Payer: COMMERCIAL

## 2021-11-10 DIAGNOSIS — F90.0 ADHD (ATTENTION DEFICIT HYPERACTIVITY DISORDER), INATTENTIVE TYPE: ICD-10-CM

## 2021-11-10 DIAGNOSIS — F98.8 ATTENTION DEFICIT DISORDER (ADD) IN ADULT: ICD-10-CM

## 2021-11-10 PROCEDURE — 99442 PR PHYS/QHP TELEPHONE EVALUATION 11-20 MIN: CPT | Performed by: FAMILY MEDICINE

## 2021-11-10 RX ORDER — METHYLPHENIDATE HYDROCHLORIDE 5 MG/1
5 TABLET ORAL DAILY
Qty: 30 TABLET | Refills: 0 | Status: SHIPPED | OUTPATIENT
Start: 2021-11-10 | End: 2022-02-25 | Stop reason: SDUPTHER

## 2021-11-10 RX ORDER — METHYLPHENIDATE HYDROCHLORIDE 5 MG/1
5 TABLET ORAL DAILY
Qty: 30 TABLET | Refills: 0 | Status: SHIPPED | OUTPATIENT
Start: 2021-12-10 | End: 2022-02-25 | Stop reason: SDUPTHER

## 2021-11-10 RX ORDER — METHYLPHENIDATE HYDROCHLORIDE 5 MG/1
5 TABLET ORAL DAILY
Qty: 30 TABLET | Refills: 0 | Status: SHIPPED | OUTPATIENT
Start: 2022-01-09 | End: 2022-02-25 | Stop reason: SDUPTHER

## 2021-11-10 NOTE — PROGRESS NOTES
Marcin Bejarano is a 31 y.o. male who was seen by synchronous (real-time) audio technology on 11/10/2021. Pt was seen at home. Provider was at the office. No other participants in this encounter. Subjective:   Behavioral Problem    HPI:    HPI:  ADHD  Type: mixed  Current control: Good   Current symptoms: mild inattention  Current controller: ritalin 5mg daily to BID PRN  Current side effects: none    PMH, SH, Medications/Allergies: reviewed, on chart. Current Outpatient Medications   Medication Sig    methylphenidate HCl (RITALIN) 5 mg tablet Take 1 Tablet by mouth daily for 30 days. Max Daily Amount: 5 mg.  Cetirizine (ZYRTEC) 10 mg cap Take  by mouth. No current facility-administered medications for this visit. ROS:  Constitutional: No fever, chills or abnormal weight loss  Respiratory: No cough, SOB   CV: No chest pain or Palpitations    Physical Examination:    PMH, SH, Medications/Allergies: reviewed, on chart. Current Outpatient Medications   Medication Sig    methylphenidate HCl (RITALIN) 5 mg tablet Take 1 Tablet by mouth daily for 30 days. Max Daily Amount: 5 mg.  Cetirizine (ZYRTEC) 10 mg cap Take  by mouth. No current facility-administered medications for this visit. No Known Allergies    ROS:  Constitutional: No fever, chills or abnormal weight loss  Respiratory: No cough, SOB   CV: No chest pain or Palpitations    VS review:   Wt Readings from Last 3 Encounters:   08/11/21 207 lb 5 oz (94 kg)   12/14/20 210 lb 9.6 oz (95.5 kg)   03/03/20 214 lb (97.1 kg)     BP Readings from Last 3 Encounters:   08/11/21 122/78   12/14/20 130/80   03/03/20 112/60       Objective:     General: alert, cooperative, no distress   Mental  status: mental status: alert, oriented to person, place, and time, normal mood, behavior, speech, dress, motor activity, and thought processes   Resp: resp: normal effort and no respiratory distress   Neuro: neuro: no gross deficits         Lab Results Component Value Date/Time    Cholesterol, total 186 08/11/2021 09:24 AM    Cholesterol, total 186 08/11/2021 09:24 AM    HDL Cholesterol 73 08/11/2021 09:24 AM    HDL Cholesterol 73 08/11/2021 09:24 AM    LDL, calculated 99 08/11/2021 09:24 AM    LDL, calculated 99 08/11/2021 09:24 AM    LDL, calculated 120 (H) 08/26/2019 09:40 AM    VLDL, calculated 14 08/11/2021 09:24 AM    VLDL, calculated 14 08/11/2021 09:24 AM    VLDL, calculated 23 08/26/2019 09:40 AM    Triglyceride 74 08/11/2021 09:24 AM    Triglyceride 74 08/11/2021 09:24 AM     Lab Results   Component Value Date/Time    Glucose 87 08/11/2021 09:24 AM    Glucose 87 08/11/2021 09:24 AM       3 most recent PHQ Screens 11/10/2021   PHQ Not Done -   Little interest or pleasure in doing things Not at all   Feeling down, depressed, irritable, or hopeless Not at all   Total Score PHQ 2 0   Trouble falling or staying asleep, or sleeping too much -   Feeling tired or having little energy -   Poor appetite, weight loss, or overeating -   Feeling bad about yourself - or that you are a failure or have let yourself or your family down -   Trouble concentrating on things such as school, work, reading, or watching TV -   Moving or speaking so slowly that other people could have noticed; or the opposite being so fidgety that others notice -   Thoughts of being better off dead, or hurting yourself in some way -   PHQ 9 Score -   How difficult have these problems made it for you to do your work, take care of your home and get along with others -     ASSESSMENT AND PLAN:     Attention deficit, youth onset  Much improved sx. No med ADR/SE's. Con't ritalin 5mg qam and titrate PRN. RTC 3m PRN   Pain level 0/10  1. Have you been to the ER, urgent care clinic since your last visit? Hospitalized since your last visit? No    2. Have you seen or consulted any other health care providers outside of the [address] [place] [address] since your last visit?   Include any pap smears or colon screening. No    Identified pt with two pt identifiers(name and ). Reviewed record in preparation for visit and have obtained necessary documentation. Symptom review:    NO  Fever   NO  Shaking chills  NO  Cough  NO Headaches  NO  Body aches  NO  Coughing up blood  NO  Chest congestion  NO  Chest pain  NO  Shortness of breath  NO  Profound Loss of smell/taste  NO  Nausea/Vomiting   NO  Loose stool/Diarrhea  No)  any skin issues      Time-based coding, delete if not needed: I spent at least 15 minutes with this established patient, and >50% of the time was spent counseling and/or coordinating care regarding care plan and expected course  Dolly Carrillo MD    Due to this being a TeleHealth evaluation, many elements of the physical examination are unable to be assessed. We discussed the expected course, resolution and complications of the diagnosis(es) in detail. Medication risks, benefits, costs, interactions, and alternatives were discussed as indicated. I advised him to contact the office if his condition worsens, changes or fails to improve as anticipated. He expressed understanding with the diagnosis(es) and plan. Pursuant to the emergency declaration under the [facility] waiver authority and the [employer] Act, this Virtual  Visit was conducted, with patient's consent, to reduce the patient's risk of exposure to COVID-19 and provide continuity of care for an established patient. Services were provided through audio synchronous discussion virtually to substitute for in-person clinic visit. Consent:  He and/or his healthcare decision maker is aware that this patient-initiated Telehealth encounter is a billable service, with coverage as determined by his insurance carrier. He is aware that he may receive a bill and has provided verbal consent to proceed.     CPT Codes 13799-36740 for Established Patients may apply to this Telehealth Visit

## 2021-12-15 DIAGNOSIS — F98.8 ATTENTION DEFICIT DISORDER (ADD) IN ADULT: ICD-10-CM

## 2021-12-15 DIAGNOSIS — F90.0 ADHD (ATTENTION DEFICIT HYPERACTIVITY DISORDER), INATTENTIVE TYPE: ICD-10-CM

## 2021-12-15 RX ORDER — METHYLPHENIDATE HYDROCHLORIDE 5 MG/1
5 TABLET ORAL DAILY
Qty: 30 TABLET | Refills: 0 | Status: CANCELLED | OUTPATIENT
Start: 2022-01-09 | End: 2022-02-08

## 2021-12-15 RX ORDER — METHYLPHENIDATE HYDROCHLORIDE 5 MG/1
5 TABLET ORAL DAILY
Qty: 30 TABLET | Refills: 0 | Status: CANCELLED | OUTPATIENT
Start: 2021-12-15 | End: 2022-01-14

## 2022-02-25 ENCOUNTER — OFFICE VISIT (OUTPATIENT)
Dept: FAMILY MEDICINE CLINIC | Age: 27
End: 2022-02-25
Payer: COMMERCIAL

## 2022-02-25 VITALS
HEIGHT: 74 IN | HEART RATE: 72 BPM | BODY MASS INDEX: 29.52 KG/M2 | SYSTOLIC BLOOD PRESSURE: 120 MMHG | RESPIRATION RATE: 16 BRPM | WEIGHT: 230 LBS | TEMPERATURE: 97.8 F | OXYGEN SATURATION: 98 % | DIASTOLIC BLOOD PRESSURE: 80 MMHG

## 2022-02-25 DIAGNOSIS — Z51.81 THERAPEUTIC DRUG MONITORING: ICD-10-CM

## 2022-02-25 DIAGNOSIS — F90.0 ADHD (ATTENTION DEFICIT HYPERACTIVITY DISORDER), INATTENTIVE TYPE: ICD-10-CM

## 2022-02-25 DIAGNOSIS — F98.8 ATTENTION DEFICIT DISORDER (ADD) IN ADULT: Primary | ICD-10-CM

## 2022-02-25 PROCEDURE — 99213 OFFICE O/P EST LOW 20 MIN: CPT | Performed by: FAMILY MEDICINE

## 2022-02-25 RX ORDER — METHYLPHENIDATE HYDROCHLORIDE 5 MG/1
5 TABLET ORAL DAILY
Qty: 30 TABLET | Refills: 0 | Status: SHIPPED | OUTPATIENT
Start: 2022-03-27 | End: 2022-07-13 | Stop reason: SDUPTHER

## 2022-02-25 RX ORDER — METHYLPHENIDATE HYDROCHLORIDE 5 MG/1
5 TABLET ORAL DAILY
Qty: 30 TABLET | Refills: 0 | Status: SHIPPED | OUTPATIENT
Start: 2022-04-26 | End: 2022-07-13 | Stop reason: SDUPTHER

## 2022-02-25 RX ORDER — METHYLPHENIDATE HYDROCHLORIDE 5 MG/1
5 TABLET ORAL DAILY
Qty: 30 TABLET | Refills: 0 | Status: SHIPPED | OUTPATIENT
Start: 2022-02-25 | End: 2022-07-13 | Stop reason: SDUPTHER

## 2022-02-25 NOTE — PROGRESS NOTES
Chief Complaint   Patient presents with    Behavioral Problem     Faith Knight is a 31 y.o. male who was seen by synchronous (real-time) audio technology on 2/25/2022. Pt was seen at home. Provider was at the office. No other participants in this encounter. Subjective:   Behavioral Problem    HPI:    HPI:  ADHD  Type: mixed  Current control: Good   Current symptoms: mild inattention  Current controller: ritalin 5mg daily to BID PRN  Current side effects: none    PMH, SH, Medications/Allergies: reviewed, on chart. Current Outpatient Medications   Medication Sig    Cetirizine (ZYRTEC) 10 mg cap Take  by mouth.  methylphenidate HCl (RITALIN) 5 mg tablet Take 1 Tablet by mouth daily for 30 days. Max Daily Amount: 5 mg.  methylphenidate HCl (RITALIN) 5 mg tablet Take 1 Tablet by mouth daily for 30 days. Max Daily Amount: 5 mg.  methylphenidate HCl (RITALIN) 5 mg tablet Take 1 Tablet by mouth daily for 30 days. Max Daily Amount: 5 mg. No current facility-administered medications for this visit. ROS:  Constitutional: No fever, chills or abnormal weight loss  Respiratory: No cough, SOB   CV: No chest pain or Palpitations    Physical Examination:  VS review:   Wt Readings from Last 3 Encounters:   02/25/22 230 lb (104.3 kg)   08/11/21 207 lb 5 oz (94 kg)   12/14/20 210 lb 9.6 oz (95.5 kg)     BP Readings from Last 3 Encounters:   02/25/22 120/80   08/11/21 122/78   12/14/20 130/80       Objective:     General: alert, cooperative, no distress   Mental  status: mental status: alert, oriented to person, place, and time, normal mood, behavior, speech, dress, motor activity, and thought processes   Resp: resp: normal effort and no respiratory distress   Neuro: neuro: no gross deficits         Lab Results   Component Value Date/Time    Cholesterol, total 186 08/11/2021 09:24 AM    Cholesterol, total 186 08/11/2021 09:24 AM    HDL Cholesterol 73 08/11/2021 09:24 AM    HDL Cholesterol 73 08/11/2021 09:24 AM    LDL, calculated 99 08/11/2021 09:24 AM    LDL, calculated 99 08/11/2021 09:24 AM    LDL, calculated 120 (H) 08/26/2019 09:40 AM    VLDL, calculated 14 08/11/2021 09:24 AM    VLDL, calculated 14 08/11/2021 09:24 AM    VLDL, calculated 23 08/26/2019 09:40 AM    Triglyceride 74 08/11/2021 09:24 AM    Triglyceride 74 08/11/2021 09:24 AM     Lab Results   Component Value Date/Time    Glucose 87 08/11/2021 09:24 AM    Glucose 87 08/11/2021 09:24 AM     ASSESSMENT AND PLAN:     Attention deficit, youth onset  Much improved sx. No med ADR/SE's. Con't ritalin 5mg qam and titrate PRN.     RTC 3m PRN   Pain level 0/10

## 2022-02-26 LAB
AMPHETAMINES UR QL SCN: NEGATIVE NG/ML
BARBITURATES UR QL SCN: NEGATIVE NG/ML
BENZODIAZ UR QL: NEGATIVE NG/ML
BZE UR QL: NEGATIVE NG/ML
CANNABINOIDS UR QL SCN: NEGATIVE NG/ML
OPIATES UR QL: NEGATIVE NG/ML
PCP UR QL: NEGATIVE NG/ML

## 2022-03-19 PROBLEM — F98.8 ATTENTION DEFICIT DISORDER (ADD) IN ADULT: Status: ACTIVE | Noted: 2021-08-11

## 2022-06-08 ENCOUNTER — VIRTUAL VISIT (OUTPATIENT)
Dept: FAMILY MEDICINE CLINIC | Age: 27
End: 2022-06-08
Payer: COMMERCIAL

## 2022-06-08 DIAGNOSIS — J06.9 UPPER RESPIRATORY INFECTION, ACUTE: ICD-10-CM

## 2022-06-08 DIAGNOSIS — J01.00 SUBACUTE MAXILLARY SINUSITIS: Primary | ICD-10-CM

## 2022-06-08 DIAGNOSIS — Z51.81 THERAPEUTIC DRUG MONITORING: ICD-10-CM

## 2022-06-08 DIAGNOSIS — F98.8 ATTENTION DEFICIT DISORDER (ADD) IN ADULT: ICD-10-CM

## 2022-06-08 DIAGNOSIS — R09.81 SINUS CONGESTION: ICD-10-CM

## 2022-06-08 PROCEDURE — 99442 PR PHYS/QHP TELEPHONE EVALUATION 11-20 MIN: CPT | Performed by: FAMILY MEDICINE

## 2022-06-08 RX ORDER — AMOXICILLIN 875 MG/1
875 TABLET, FILM COATED ORAL 2 TIMES DAILY
Qty: 20 TABLET | Refills: 0 | Status: SHIPPED | OUTPATIENT
Start: 2022-06-08

## 2022-06-08 RX ORDER — IBUPROFEN 800 MG/1
800 TABLET ORAL
Qty: 30 TABLET | Refills: 1 | Status: SHIPPED | OUTPATIENT
Start: 2022-06-08

## 2022-06-08 NOTE — PROGRESS NOTES
Malcom Sapp is a 31 y.o. male presenting for/with:    Chief Complaint   Patient presents with    Sinus Infection     congestion x 5 days, yellow mucous, sore throat, denies fevere/aches. There were no vitals taken for this visit. Pain Scale: /10  Pain Location:     1. \"Have you been to the ER, urgent care clinic since your last visit? Hospitalized since your last visit? \" No    2. \"Have you seen or consulted any other health care providers outside of the [address] [place] [address] since your last visit? \" No     3. For patients aged 39-70: Has the patient had a colonoscopy / FIT/ Cologuard? NA - based on age      If the patient is female:    4. For patients aged 41-77: Has the patient had a mammogram within the past 2 years? NA - based on age or sex      11. For patients aged 21-65: Has the patient had a pap smear? NA - based on age or sex      Symptom review:  NO  Fever   NO  Shaking chills  YES  Cough  NO  Body aches  NO  Coughing up blood  NO  Chest congestion  NO  Chest pain  YES  Shortness of breath  NO  Profound Loss of smell/taste  NO  Nausea/Vomiting   NO  Loose stool/Diarrhea  NO  any skin issues    Patient Risk Factors Reviewed as follows:  NO  have you been in Close contact with confirmed COVID19 patient   NO  History of recent travel to affected geographical areas within the past 14 days  NO  COPD  NO  Active Cancer/Leukemia/Lymphoma/Chemotherapy  NO  Oral steroid use  NO  Pregnant  NO  Diabetes Mellitus  NO  Heart disease  NO  Asthma  NO Health care worker at home  [address] care worker  NO Is there a Pregnant Woman in the home  NO Dialysis pt in the home   NO a large number of people living in the home    Learning Assessment 2/25/2022   PRIMARY LEARNER Patient   PRIMARY LANGUAGE ENGLISH   LEARNER PREFERENCE PRIMARY READING   ANSWERED BY Patient   RELATIONSHIP SELF     Fall Risk Assessment, last 12 mths 6/8/2022   Able to walk? Yes   Fall in past 12 months? 0   Do you feel unsteady?  0   Are you worried about falling 0       3 most recent PHQ Screens 6/8/2022   PHQ Not Done -   Little interest or pleasure in doing things Not at all   Feeling down, depressed, irritable, or hopeless Not at all   Total Score PHQ 2 0   Trouble falling or staying asleep, or sleeping too much -   Feeling tired or having little energy -   Poor appetite, weight loss, or overeating -   Feeling bad about yourself - or that you are a failure or have let yourself or your family down -   Trouble concentrating on things such as school, work, reading, or watching TV -   Moving or speaking so slowly that other people could have noticed; or the opposite being so fidgety that others notice -   Thoughts of being better off dead, or hurting yourself in some way -   PHQ 9 Score -   How difficult have these problems made it for you to do your work, take care of your home and get along with others -     Abuse Screening Questionnaire 6/8/2022   Do you ever feel afraid of your partner? N   Are you in a relationship with someone who physically or mentally threatens you? N   Is it safe for you to go home?  Y       ADL Assessment 6/8/2022   Feeding yourself No Help Needed   Getting from bed to chair No Help Needed   Getting dressed No Help Needed   Bathing or showering No Help Needed   Walk across the room (includes cane/walker) No Help Needed   Using the telphone No Help Needed   Taking your medications No Help Needed   Preparing meals No Help Needed   Managing money (expenses/bills) No Help Needed   Moderately strenuous housework (laundry) No Help Needed   Shopping for personal items (toiletries/medicines) No Help Needed   Shopping for groceries No Help Needed   Driving No Help Needed   Climbing a flight of stairs No Help Needed   Getting to places beyond walking distances No Help Needed      Advance Care Planning 8/23/2021   Patient's Healthcare Decision Maker is: Legal Next of Kin

## 2022-06-08 NOTE — PROGRESS NOTES
Chief Complaint   Patient presents with    Sinus Infection     congestion x 5 days, yellow mucous, sore throat, denies fevere/aches. Jovanny Ramirez is a 31 y.o. male who was seen by synchronous (real-time) audio technology on 6/8/2022. Pt was seen at home. Provider was at the office. No other participants in this encounter. Subjective:   Sinus Infection (congestion x 5 days, yellow mucous, sore throat, denies fevere/aches. )    HPI:    Symptoms include cough, chest congestion, sinus congestion for past 5 days. Using APAP cold and flu, afrin x3 days today. stable since that time. Evaluation to date: none. No f/c, no n/v/d/rash. Home Covid test NEG 6/6/22. ADHD  Type: mixed  Current control: Good   Current symptoms: mild inattention  Current controller: ritalin 5mg daily to BID PRN  Current side effects: none    PMH, SH, Medications/Allergies: reviewed, on chart. Current Outpatient Medications   Medication Sig    Cetirizine (ZYRTEC) 10 mg cap Take  by mouth.  methylphenidate HCl (RITALIN) 5 mg tablet Take 1 Tablet by mouth daily for 30 days. Max Daily Amount: 5 mg.  methylphenidate HCl (RITALIN) 5 mg tablet Take 1 Tablet by mouth daily for 30 days. Max Daily Amount: 5 mg.  methylphenidate HCl (RITALIN) 5 mg tablet Take 1 Tablet by mouth daily for 30 days. Max Daily Amount: 5 mg. No current facility-administered medications for this visit.       ROS:  Constitutional: No fever, chills or abnormal weight loss  Respiratory: No cough, SOB   CV: No chest pain or Palpitations    Physical Examination:  VS review:  Home wt 222#  Wt Readings from Last 3 Encounters:   02/25/22 230 lb (104.3 kg)   08/11/21 207 lb 5 oz (94 kg)   12/14/20 210 lb 9.6 oz (95.5 kg)     BP Readings from Last 3 Encounters:   02/25/22 120/80   08/11/21 122/78   12/14/20 130/80       Objective:     General: alert, cooperative, no distress   Mental  status: mental status: alert, oriented to person, place, and time, normal mood, behavior, speech, dress, motor activity, and thought processes   Resp: resp: normal effort and no respiratory distress   Neuro: neuro: no gross deficits         Lab Results   Component Value Date/Time    Cholesterol, total 186 08/11/2021 09:24 AM    Cholesterol, total 186 08/11/2021 09:24 AM    HDL Cholesterol 73 08/11/2021 09:24 AM    HDL Cholesterol 73 08/11/2021 09:24 AM    LDL, calculated 99 08/11/2021 09:24 AM    LDL, calculated 99 08/11/2021 09:24 AM    LDL, calculated 120 (H) 08/26/2019 09:40 AM    VLDL, calculated 14 08/11/2021 09:24 AM    VLDL, calculated 14 08/11/2021 09:24 AM    VLDL, calculated 23 08/26/2019 09:40 AM    Triglyceride 74 08/11/2021 09:24 AM    Triglyceride 74 08/11/2021 09:24 AM     Lab Results   Component Value Date/Time    Glucose 87 08/11/2021 09:24 AM    Glucose 87 08/11/2021 09:24 AM     ASSESSMENT AND PLAN:     URI sx with sinus congestion  >5d. Con't fluids, watch out for interactions between stimulants and Decongestants. Put afrin on hold x7, can use nasacort or flonase instead. Would not recommend antibiotics unless develops fever or chills. Treat discomfort with motrin 800mg TID PRN pain, con't zyrtec 10mg/d. Attention deficit, youth onset  Well controlled sx. No med ADR/SE's. Con't ritalin 5mg qam.  Still has plenty for now, can RF after 7/1/2022 on request. IS due for UDS 8/2022, will order for then    RTC 3m PRN       Time-based coding, delete if not needed: I spent at least 15 minutes with this established patient, and >50% of the time was spent counseling and/or coordinating care regarding care plan and expected course  Severa Burlington, MD    Due to this being a TeleHealth evaluation, many elements of the physical examination are unable to be assessed. We discussed the expected course, resolution and complications of the diagnosis(es) in detail. Medication risks, benefits, costs, interactions, and alternatives were discussed as indicated.   I advised him to contact the office if his condition worsens, changes or fails to improve as anticipated. He expressed understanding with the diagnosis(es) and plan. Pursuant to the emergency declaration under the [facility] waiver authority and the [employer] Act, this Virtual  Visit was conducted, with patient's consent, to reduce the patient's risk of exposure to COVID-19 and provide continuity of care for an established patient. Services were provided through audio synchronous discussion virtually to substitute for in-person clinic visit. Consent:  He and/or his healthcare decision maker is aware that this patient-initiated Telehealth encounter is a billable service, which includes applicable copays. This virtual visit was conducted with patient's (and/or legal guardian's) consent.  The patient was located in a state where the provider was licensed to provide care

## 2022-07-13 DIAGNOSIS — F90.0 ADHD (ATTENTION DEFICIT HYPERACTIVITY DISORDER), INATTENTIVE TYPE: ICD-10-CM

## 2022-07-13 DIAGNOSIS — F98.8 ATTENTION DEFICIT DISORDER (ADD) IN ADULT: ICD-10-CM

## 2022-07-13 RX ORDER — METHYLPHENIDATE HYDROCHLORIDE 5 MG/1
5 TABLET ORAL DAILY
Qty: 30 TABLET | Refills: 0 | Status: SHIPPED | OUTPATIENT
Start: 2022-07-13 | End: 2022-08-12

## 2022-07-13 RX ORDER — METHYLPHENIDATE HYDROCHLORIDE 5 MG/1
5 TABLET ORAL DAILY
Qty: 30 TABLET | Refills: 0 | Status: SHIPPED | OUTPATIENT
Start: 2022-09-11 | End: 2022-10-11

## 2022-07-13 RX ORDER — METHYLPHENIDATE HYDROCHLORIDE 5 MG/1
5 TABLET ORAL DAILY
Qty: 30 TABLET | Refills: 0 | Status: SHIPPED | OUTPATIENT
Start: 2022-08-12 | End: 2022-09-11

## 2022-12-29 ENCOUNTER — NURSE TRIAGE (OUTPATIENT)
Dept: OTHER | Facility: CLINIC | Age: 27
End: 2022-12-29

## 2022-12-29 NOTE — TELEPHONE ENCOUNTER
Location of patient: [place]    Received call from Orville Pearson at [facility] with [employer]. Subjective: Caller states \"sick since last Tuesday, trouble swallowing liquids, trouble sleeping\"     Current Symptoms: see above, had some symptoms that have resolved, but now has throat pain    Onset: 10 days ago; worsening with throat pain    Associated Symptoms: reduced activity, reduced appetite, reduced fluid intake    Pain Severity: 8/10; sharp and stabbing; with swallowing    Temperature: denies fever     What has been tried: dayquil, mucinex, nyquil    LMP: NA Pregnant: NA    Recommended disposition: Go to ED Now    Care advice provided, patient verbalizes understanding; denies any other questions or concerns; instructed to call back for any new or worsening symptoms. Patient/caller agrees to proceed to nearby Emergency Department    Attention Provider: Thank you for allowing me to participate in the care of your patient. The patient was connected to triage in response to information provided to the [facility]. Please do not respond through this encounter as the response is not directed to a shared pool.     Reason for Disposition   [1] Drooling or spitting out saliva (because can't swallow) AND [2] normal breathing    Protocols used: Sore Throat-ADULT-

## 2023-02-03 ENCOUNTER — OFFICE VISIT (OUTPATIENT)
Dept: FAMILY MEDICINE CLINIC | Age: 28
End: 2023-02-03
Payer: COMMERCIAL

## 2023-02-03 VITALS
TEMPERATURE: 97.8 F | BODY MASS INDEX: 31.08 KG/M2 | WEIGHT: 242.2 LBS | DIASTOLIC BLOOD PRESSURE: 78 MMHG | HEIGHT: 74 IN | HEART RATE: 94 BPM | OXYGEN SATURATION: 99 % | RESPIRATION RATE: 18 BRPM | SYSTOLIC BLOOD PRESSURE: 132 MMHG

## 2023-02-03 DIAGNOSIS — F43.23 ADJUSTMENT DISORDER WITH MIXED ANXIETY AND DEPRESSED MOOD: ICD-10-CM

## 2023-02-03 DIAGNOSIS — F41.0 PANIC ATTACK: ICD-10-CM

## 2023-02-03 DIAGNOSIS — Z51.81 THERAPEUTIC DRUG MONITORING: ICD-10-CM

## 2023-02-03 DIAGNOSIS — F98.8 ATTENTION DEFICIT DISORDER (ADD) IN ADULT: Primary | ICD-10-CM

## 2023-02-03 DIAGNOSIS — F90.0 ADHD (ATTENTION DEFICIT HYPERACTIVITY DISORDER), INATTENTIVE TYPE: ICD-10-CM

## 2023-02-03 RX ORDER — METHYLPHENIDATE HYDROCHLORIDE 5 MG/1
5 TABLET ORAL DAILY
Qty: 30 TABLET | Refills: 0 | Status: SHIPPED | OUTPATIENT
Start: 2023-04-04 | End: 2023-05-04

## 2023-02-03 RX ORDER — METHYLPHENIDATE HYDROCHLORIDE 5 MG/1
5 TABLET ORAL DAILY
Qty: 30 TABLET | Refills: 0 | Status: SHIPPED | OUTPATIENT
Start: 2023-03-05 | End: 2023-04-04

## 2023-02-03 RX ORDER — METHYLPHENIDATE HYDROCHLORIDE 5 MG/1
5 TABLET ORAL DAILY
Qty: 30 TABLET | Refills: 0 | Status: SHIPPED | OUTPATIENT
Start: 2023-02-03 | End: 2023-03-05

## 2023-02-03 NOTE — PROGRESS NOTES
Khalif Barry is a 28 y.o. male presenting for/with:    Chief Complaint   Patient presents with    Medication Evaluation    Attention Deficit Disorder       Visit Vitals  /78   Pulse 94   Temp 97.8 °F (36.6 °C) (Temporal)   Resp 18   Ht 6' 2\" (1.88 m)   Wt 242 lb 3.2 oz (109.9 kg)   SpO2 99%   BMI 31.10 kg/m²     Pain Scale: /10  Pain Location:     1. \"Have you been to the ER, urgent care clinic since your last visit? Hospitalized since your last visit? \" No    2. \"Have you seen or consulted any other health care providers outside of the [address] [place] [address] since your last visit? \" No     3. For patients aged 39-70: Has the patient had a colonoscopy / FIT/ Cologuard? NA - based on age      If the patient is female:    4. For patients aged 41-77: Has the patient had a mammogram within the past 2 years? NA - based on age or sex      11. For patients aged 21-65: Has the patient had a pap smear? NA - based on age or sex      Symptom review:  Learning Assessment 2/25/2022   PRIMARY LEARNER Patient   PRIMARY LANGUAGE ENGLISH   LEARNER PREFERENCE PRIMARY READING   ANSWERED BY Patient   RELATIONSHIP SELF     Fall Risk Assessment, last 12 mths 2/3/2023   Able to walk? Yes   Fall in past 12 months? 0   Do you feel unsteady?  0   Are you worried about falling 0       3 most recent PHQ Screens 2/3/2023   PHQ Not Done -   Little interest or pleasure in doing things Not at all   Feeling down, depressed, irritable, or hopeless Not at all   Total Score PHQ 2 0   Trouble falling or staying asleep, or sleeping too much -   Feeling tired or having little energy -   Poor appetite, weight loss, or overeating -   Feeling bad about yourself - or that you are a failure or have let yourself or your family down -   Trouble concentrating on things such as school, work, reading, or watching TV -   Moving or speaking so slowly that other people could have noticed; or the opposite being so fidgety that others notice -   Thoughts of being better off dead, or hurting yourself in some way -   PHQ 9 Score -   How difficult have these problems made it for you to do your work, take care of your home and get along with others -     Abuse Screening Questionnaire 2/3/2023   Do you ever feel afraid of your partner? N   Are you in a relationship with someone who physically or mentally threatens you? N   Is it safe for you to go home?  Y       ADL Assessment 2/3/2023   Feeding yourself No Help Needed   Getting from bed to chair No Help Needed   Getting dressed No Help Needed   Bathing or showering No Help Needed   Walk across the room (includes cane/walker) No Help Needed   Using the telphone No Help Needed   Taking your medications No Help Needed   Preparing meals No Help Needed   Managing money (expenses/bills) No Help Needed   Moderately strenuous housework (laundry) No Help Needed   Shopping for personal items (toiletries/medicines) No Help Needed   Shopping for groceries No Help Needed   Driving No Help Needed   Climbing a flight of stairs No Help Needed   Getting to places beyond walking distances No Help Needed      Advance Care Planning 2/3/2023   Patient's Healthcare Decision Maker is: Legal Next of Kin   Confirm Advance Directive None   Patient Would Like to Complete Advance Directive No

## 2023-02-03 NOTE — PROGRESS NOTES
Chief Complaint   Patient presents with    Medication Evaluation    Attention Deficit Disorder     Delmy Piña is a 28 y.o. male   Subjective:   Medication Evaluation and Attention Deficit Disorder    HPI:  ADHD  Type: mixed  Current control: Good   Current symptoms: mild inattention  Current controller: ritalin 5mg daily to BID PRN  Current side effects: none    PMH, SH, Medications/Allergies: reviewed, on chart. Current Outpatient Medications   Medication Sig    ibuprofen (MOTRIN) 800 mg tablet Take 1 Tablet by mouth three (3) times daily as needed for Pain. Cetirizine 10 mg cap Take  by mouth.    methylphenidate HCl (RITALIN) 5 mg tablet Take 1 Tablet by mouth daily for 30 days. Max Daily Amount: 5 mg.    methylphenidate HCl (RITALIN) 5 mg tablet Take 1 Tablet by mouth daily for 30 days. Max Daily Amount: 5 mg.    methylphenidate HCl (RITALIN) 5 mg tablet Take 1 Tablet by mouth daily for 30 days. Max Daily Amount: 5 mg. No current facility-administered medications for this visit.       ROS:  Constitutional: No fever, chills or abnormal weight loss  Respiratory: No cough, SOB   CV: No chest pain or Palpitations    Objective:     Physical Examination:  VS review:  Home wt +12#  Wt Readings from Last 3 Encounters:   02/03/23 242 lb 3.2 oz (109.9 kg)   02/25/22 230 lb (104.3 kg)   08/11/21 207 lb 5 oz (94 kg)     BP Readings from Last 3 Encounters:   02/03/23 132/78   02/25/22 120/80   08/11/21 122/78     General: alert, cooperative, no distress   Mental  status: mental status: alert, oriented to person, place, and time, normal mood, behavior, speech, dress, motor activity, and thought processes   Resp: resp: normal effort and no respiratory distress   Neuro: neuro: no gross deficits         Lab Results   Component Value Date/Time    Cholesterol, total 186 08/11/2021 09:24 AM    Cholesterol, total 186 08/11/2021 09:24 AM    HDL Cholesterol 73 08/11/2021 09:24 AM    HDL Cholesterol 73 08/11/2021 09:24 AM LDL, calculated 99 08/11/2021 09:24 AM    LDL, calculated 99 08/11/2021 09:24 AM    LDL, calculated 120 (H) 08/26/2019 09:40 AM    VLDL, calculated 14 08/11/2021 09:24 AM    VLDL, calculated 14 08/11/2021 09:24 AM    VLDL, calculated 23 08/26/2019 09:40 AM    Triglyceride 74 08/11/2021 09:24 AM    Triglyceride 74 08/11/2021 09:24 AM     Lab Results   Component Value Date/Time    Glucose 87 08/11/2021 09:24 AM    Glucose 87 08/11/2021 09:24 AM     3 most recent PHQ Screens 2/3/2023   PHQ Not Done -   Little interest or pleasure in doing things Not at all   Feeling down, depressed, irritable, or hopeless Not at all   Total Score PHQ 2 0   Trouble falling or staying asleep, or sleeping too much Not at all   Feeling tired or having little energy Not at all   Poor appetite, weight loss, or overeating Not at all   Feeling bad about yourself - or that you are a failure or have let yourself or your family down Not at all   Trouble concentrating on things such as school, work, reading, or watching TV Several days   Moving or speaking so slowly that other people could have noticed; or the opposite being so fidgety that others notice Not at all   Thoughts of being better off dead, or hurting yourself in some way Not at all   PHQ 9 Score 1   How difficult have these problems made it for you to do your work, take care of your home and get along with others Not difficult at all     EKG:  NSR, nl axis, intervals, no ischemic changes. ASSESSMENT AND PLAN:     Attention deficit, youth onset  Well controlled sx. No med ADR/SE's. Con't ritalin 5mg qam.  Still has plenty for now, can RF after 7/1/2022 on request. UDS 8/2022, will order for then    Spell of palpitations  Single episode. Had had mild increased worries lately, but not outright panic spells. Has been using cognitive measures effectively and sleeping well since. Check labs. Tx PRN.     RTC 3m PRN

## 2023-02-03 NOTE — PATIENT INSTRUCTIONS
Area therapists: Eli[facility] Malik [facility]: [phone]  Also consider online therapy, [facility] has a good reputation. Lm that Margot filled Levothyroxine

## 2023-02-04 LAB
ANION GAP SERPL CALC-SCNC: 5 MMOL/L (ref 5–15)
BASOPHILS # BLD: 0.1 K/UL (ref 0–0.1)
BASOPHILS NFR BLD: 1 % (ref 0–1)
BUN SERPL-MCNC: 14 MG/DL (ref 6–20)
BUN/CREAT SERPL: 13 (ref 12–20)
CALCIUM SERPL-MCNC: 9.9 MG/DL (ref 8.5–10.1)
CHLORIDE SERPL-SCNC: 105 MMOL/L (ref 97–108)
CO2 SERPL-SCNC: 29 MMOL/L (ref 21–32)
CREAT SERPL-MCNC: 1.04 MG/DL (ref 0.7–1.3)
DIFFERENTIAL METHOD BLD: ABNORMAL
EOSINOPHIL # BLD: 0.1 K/UL (ref 0–0.4)
EOSINOPHIL NFR BLD: 1 % (ref 0–7)
ERYTHROCYTE [DISTWIDTH] IN BLOOD BY AUTOMATED COUNT: 11.9 % (ref 11.5–14.5)
GLUCOSE SERPL-MCNC: 89 MG/DL (ref 65–100)
HCT VFR BLD AUTO: 47.9 % (ref 36.6–50.3)
HGB BLD-MCNC: 16.2 G/DL (ref 12.1–17)
IMM GRANULOCYTES # BLD AUTO: 0 K/UL (ref 0–0.04)
IMM GRANULOCYTES NFR BLD AUTO: 1 % (ref 0–0.5)
LYMPHOCYTES # BLD: 1.5 K/UL (ref 0.8–3.5)
LYMPHOCYTES NFR BLD: 19 % (ref 12–49)
MCH RBC QN AUTO: 29.2 PG (ref 26–34)
MCHC RBC AUTO-ENTMCNC: 33.8 G/DL (ref 30–36.5)
MCV RBC AUTO: 86.3 FL (ref 80–99)
MONOCYTES # BLD: 0.8 K/UL (ref 0–1)
MONOCYTES NFR BLD: 11 % (ref 5–13)
NEUTS SEG # BLD: 5.2 K/UL (ref 1.8–8)
NEUTS SEG NFR BLD: 67 % (ref 32–75)
NRBC # BLD: 0 K/UL (ref 0–0.01)
NRBC BLD-RTO: 0 PER 100 WBC
PLATELET # BLD AUTO: 214 K/UL (ref 150–400)
PMV BLD AUTO: 10.4 FL (ref 8.9–12.9)
POTASSIUM SERPL-SCNC: 3.9 MMOL/L (ref 3.5–5.1)
RBC # BLD AUTO: 5.55 M/UL (ref 4.1–5.7)
SODIUM SERPL-SCNC: 139 MMOL/L (ref 136–145)
TSH SERPL DL<=0.05 MIU/L-ACNC: 2.77 UIU/ML (ref 0.36–3.74)
WBC # BLD AUTO: 7.6 K/UL (ref 4.1–11.1)

## 2023-05-19 RX ORDER — METHYLPHENIDATE HYDROCHLORIDE 5 MG/1
5 TABLET ORAL DAILY
COMMUNITY
Start: 2023-02-03

## 2023-05-19 RX ORDER — IBUPROFEN 800 MG/1
800 TABLET ORAL 3 TIMES DAILY PRN
COMMUNITY
Start: 2022-06-08

## 2023-08-25 ENCOUNTER — TELEPHONE (OUTPATIENT)
Age: 28
End: 2023-08-25

## 2023-08-25 NOTE — TELEPHONE ENCOUNTER
----- Message from Yasmeen Curry sent at 8/25/2023 10:02 AM EDT -----  Subject: Message to Provider    QUESTIONS  Information for Provider? patient is requesting to have a call back in   regards to a consern on a billing issues on a lab order on 2/3/2023 ;   patient state velma he was informed that his labs were out of network and   has spoke to billing and his insurance and would like to discuss possible   option to get issue resolved.   ---------------------------------------------------------------------------  --------------  Shaji ANTONIO  [phone]; OK to leave message on voicemail  ---------------------------------------------------------------------------  --------------  SCRIPT ANSWERS  Relationship to Patient?  Self

## 2024-05-07 ENCOUNTER — OFFICE VISIT (OUTPATIENT)
Age: 29
End: 2024-05-07
Payer: COMMERCIAL

## 2024-05-07 VITALS
SYSTOLIC BLOOD PRESSURE: 127 MMHG | RESPIRATION RATE: 20 BRPM | BODY MASS INDEX: 30.72 KG/M2 | WEIGHT: 239.4 LBS | OXYGEN SATURATION: 100 % | DIASTOLIC BLOOD PRESSURE: 77 MMHG | HEIGHT: 74 IN | HEART RATE: 62 BPM | TEMPERATURE: 97.8 F

## 2024-05-07 DIAGNOSIS — Z23 ENCOUNTER FOR IMMUNIZATION: ICD-10-CM

## 2024-05-07 DIAGNOSIS — Z87.898 HISTORY OF ALCOHOL USE: ICD-10-CM

## 2024-05-07 DIAGNOSIS — Z13.220 SCREENING FOR LIPOID DISORDERS: ICD-10-CM

## 2024-05-07 DIAGNOSIS — F98.8 ATTENTION DEFICIT DISORDER (ADD) IN ADULT: Primary | ICD-10-CM

## 2024-05-07 PROCEDURE — 90715 TDAP VACCINE 7 YRS/> IM: CPT | Performed by: FAMILY MEDICINE

## 2024-05-07 PROCEDURE — 90471 IMMUNIZATION ADMIN: CPT | Performed by: FAMILY MEDICINE

## 2024-05-07 PROCEDURE — 99213 OFFICE O/P EST LOW 20 MIN: CPT | Performed by: FAMILY MEDICINE

## 2024-05-07 SDOH — ECONOMIC STABILITY: FOOD INSECURITY: WITHIN THE PAST 12 MONTHS, YOU WORRIED THAT YOUR FOOD WOULD RUN OUT BEFORE YOU GOT MONEY TO BUY MORE.: NEVER TRUE

## 2024-05-07 SDOH — ECONOMIC STABILITY: FOOD INSECURITY: WITHIN THE PAST 12 MONTHS, THE FOOD YOU BOUGHT JUST DIDN'T LAST AND YOU DIDN'T HAVE MONEY TO GET MORE.: NEVER TRUE

## 2024-05-07 SDOH — ECONOMIC STABILITY: HOUSING INSECURITY
IN THE LAST 12 MONTHS, WAS THERE A TIME WHEN YOU DID NOT HAVE A STEADY PLACE TO SLEEP OR SLEPT IN A SHELTER (INCLUDING NOW)?: NO

## 2024-05-07 SDOH — ECONOMIC STABILITY: INCOME INSECURITY: HOW HARD IS IT FOR YOU TO PAY FOR THE VERY BASICS LIKE FOOD, HOUSING, MEDICAL CARE, AND HEATING?: NOT HARD AT ALL

## 2024-05-07 ASSESSMENT — PATIENT HEALTH QUESTIONNAIRE - PHQ9
SUM OF ALL RESPONSES TO PHQ QUESTIONS 1-9: 0
SUM OF ALL RESPONSES TO PHQ QUESTIONS 1-9: 0
1. LITTLE INTEREST OR PLEASURE IN DOING THINGS: NOT AT ALL
2. FEELING DOWN, DEPRESSED OR HOPELESS: NOT AT ALL
SUM OF ALL RESPONSES TO PHQ QUESTIONS 1-9: 0
SUM OF ALL RESPONSES TO PHQ9 QUESTIONS 1 & 2: 0
SUM OF ALL RESPONSES TO PHQ QUESTIONS 1-9: 0

## 2024-05-07 NOTE — PATIENT INSTRUCTIONS
New COVID omicron vaccine due at your pharmacy. Please get when available, can help prevent severe lung disease and long covid.

## 2024-05-07 NOTE — PROGRESS NOTES
Etienne Jiang is a 28 y.o. male  Chief Complaint   Patient presents with    Annual Exam     Subjective:   Medication Evaluation and Attention Deficit Disorder    HPI:  ADHD  Type: mixed  Current control: Good   Current symptoms: mild inattention  Current controller: none. Prev on ritalin 5mg daily to BID PRN, but doing ok without it.  Current side effects: none    Hx alcohol use heavy  Pt gradually increased his alcohol use over past couple years, usually binge drinking but got to the point where he was drinking a 750ml vodka bottle over a weekend.    Reviewed PMH, PSH, SH, Medications, allergies (see chart). Has been working out more lately, getting into better shape.  Current Outpatient Medications   Medication Sig    Cetirizine HCl 10 MG CAPS Take by mouth     No current facility-administered medications for this visit.     ROS:   General: No fever, chills, or abnormal weight loss  Respiratory: No cough, dyspnea  CV: No chest pain, palpitations    Objective:  Visit Vitals  /77 (Site: Left Upper Arm, Position: Sitting, Cuff Size: Medium Adult)   Pulse 62   Temp 97.8 °F (36.6 °C) (Temporal)   Resp 20   Ht 1.88 m (6' 2\")   Wt 108.6 kg (239 lb 6.4 oz)   SpO2 100%   BMI 30.74 kg/m²   -3#  Wt Readings from Last 3 Encounters:   05/07/24 108.6 kg (239 lb 6.4 oz)   02/03/23 109.9 kg (242 lb 3.2 oz)   02/25/22 104.3 kg (230 lb)     BP Readings from Last 3 Encounters:   05/07/24 127/77   02/03/23 132/78   02/25/22 120/80     Physical Examination: General appearance - alert, well appearing, and in no distress  Mental status - alert, oriented to person, place, and time  Eyes - pupils equal and reactive, extraocular eye movements intact  ENT - bilateral external ears and nose normal. Normal lips  Neck - supple, no significant adenopathy, no thyromegaly or mass  Chest - clear to auscultation, no wheezes, rales or rhonchi, symmetric air entry  Heart - normal rate, regular rhythm, normal S1, S2, no murmurs, rubs,

## 2024-05-07 NOTE — PROGRESS NOTES
Etienne Jiang is a 28 y.o. male presenting for/with:    Chief Complaint   Patient presents with    Annual Exam       Vitals:    05/07/24 0812   BP: 127/77   Site: Left Upper Arm   Position: Sitting   Cuff Size: Medium Adult   Pulse: 62   Resp: 20   Temp: 97.8 °F (36.6 °C)   TempSrc: Temporal   SpO2: 100%   Weight: 108.6 kg (239 lb 6.4 oz)   Height: 1.88 m (6' 2\")       Pain Scale: 0 - No pain/10  Pain Location:     \"Have you been to the ER, urgent care clinic since your last visit?  Hospitalized since your last visit?\"    NO    “Have you seen or consulted any other health care providers outside of [facility] since your last visit?”    NO                 5/7/2024     8:08 AM   PHQ-9    Little interest or pleasure in doing things 0   Feeling down, depressed, or hopeless 0   PHQ-2 Score 0   PHQ-9 Total Score 0           5/7/2024     8:10 AM   [facility] AMB LEARNING ASSESSMENT   Primary Learner Patient   Primary Language ENGLISH   Learning Preference DEMONSTRATION   Answered By patient   Relationship to Learner SELF            5/7/2024     8:14 AM   Amb Fall Risk Assessment and TUG Test   Do you feel unsteady or are you worried about falling?  no   2 or more falls in past year? no   Fall with injury in past year? no           5/7/2024     8:00 AM   ADL ASSESSMENT   Feeding yourself No Help Needed   Getting from bed to chair No Help Needed   Getting dressed No Help Needed   Bathing or showering No Help Needed   Walk across the room (includes cane/walker) No Help Needed   Using the telphone No Help Needed   Taking your medications No Help Needed   Preparing meals No Help Needed   Managing money (expenses/bills) No Help Needed   Moderately strenuous housework (laundry) No Help Needed   Shopping for personal items (toiletries/medicines) No Help Needed   Shopping for groceries No Help Needed   Driving No Help Needed   Climbing a flight of stairs No Help Needed   Getting to places beyond walking distances No Help

## 2024-05-08 LAB
ALBUMIN SERPL-MCNC: 4.8 G/DL (ref 4.3–5.2)
ALBUMIN/GLOB SERPL: 2.1 {RATIO} (ref 1.2–2.2)
ALP SERPL-CCNC: 63 IU/L (ref 44–121)
ALT SERPL-CCNC: 26 IU/L (ref 0–44)
AST SERPL-CCNC: 21 IU/L (ref 0–40)
BILIRUB SERPL-MCNC: 0.5 MG/DL (ref 0–1.2)
BUN SERPL-MCNC: 13 MG/DL (ref 6–20)
BUN/CREAT SERPL: 13 (ref 9–20)
CALCIUM SERPL-MCNC: 9.9 MG/DL (ref 8.7–10.2)
CHLORIDE SERPL-SCNC: 103 MMOL/L (ref 96–106)
CHOLEST SERPL-MCNC: 196 MG/DL (ref 100–199)
CO2 SERPL-SCNC: 25 MMOL/L (ref 20–29)
CREAT SERPL-MCNC: 0.98 MG/DL (ref 0.76–1.27)
EGFRCR SERPLBLD CKD-EPI 2021: 107 ML/MIN/1.73
GLOBULIN SER CALC-MCNC: 2.3 G/DL (ref 1.5–4.5)
GLUCOSE SERPL-MCNC: 87 MG/DL (ref 70–99)
HDLC SERPL-MCNC: 41 MG/DL
LDLC SERPL CALC-MCNC: 131 MG/DL (ref 0–99)
POTASSIUM SERPL-SCNC: 4.3 MMOL/L (ref 3.5–5.2)
PROT SERPL-MCNC: 7.1 G/DL (ref 6–8.5)
SODIUM SERPL-SCNC: 142 MMOL/L (ref 134–144)
TRIGL SERPL-MCNC: 133 MG/DL (ref 0–149)
VLDLC SERPL CALC-MCNC: 24 MG/DL (ref 5–40)

## 2024-05-08 NOTE — RESULT ENCOUNTER NOTE
Labs ok for now. Cholesterol a little up, but not bad enough to be concerning. Continue current treatment.

## 2025-02-07 ENCOUNTER — OFFICE VISIT (OUTPATIENT)
Age: 30
End: 2025-02-07
Payer: COMMERCIAL

## 2025-02-07 VITALS
RESPIRATION RATE: 16 BRPM | TEMPERATURE: 98.4 F | HEART RATE: 73 BPM | DIASTOLIC BLOOD PRESSURE: 92 MMHG | BODY MASS INDEX: 29.77 KG/M2 | SYSTOLIC BLOOD PRESSURE: 134 MMHG | HEIGHT: 74 IN | OXYGEN SATURATION: 98 % | WEIGHT: 232 LBS

## 2025-02-07 DIAGNOSIS — A69.20 ERYTHEMA MIGRANS (LYME DISEASE): Primary | ICD-10-CM

## 2025-02-07 PROCEDURE — 99213 OFFICE O/P EST LOW 20 MIN: CPT | Performed by: INTERNAL MEDICINE

## 2025-02-07 RX ORDER — DOXYCYCLINE HYCLATE 100 MG
100 TABLET ORAL 2 TIMES DAILY
Qty: 20 TABLET | Refills: 0 | Status: SHIPPED | OUTPATIENT
Start: 2025-02-07 | End: 2025-02-17

## 2025-02-07 SDOH — ECONOMIC STABILITY: FOOD INSECURITY: WITHIN THE PAST 12 MONTHS, YOU WORRIED THAT YOUR FOOD WOULD RUN OUT BEFORE YOU GOT MONEY TO BUY MORE.: NEVER TRUE

## 2025-02-07 SDOH — ECONOMIC STABILITY: FOOD INSECURITY: WITHIN THE PAST 12 MONTHS, THE FOOD YOU BOUGHT JUST DIDN'T LAST AND YOU DIDN'T HAVE MONEY TO GET MORE.: NEVER TRUE

## 2025-02-07 ASSESSMENT — PATIENT HEALTH QUESTIONNAIRE - PHQ9
SUM OF ALL RESPONSES TO PHQ QUESTIONS 1-9: 0
2. FEELING DOWN, DEPRESSED OR HOPELESS: NOT AT ALL
SUM OF ALL RESPONSES TO PHQ QUESTIONS 1-9: 0
SUM OF ALL RESPONSES TO PHQ QUESTIONS 1-9: 0
1. LITTLE INTEREST OR PLEASURE IN DOING THINGS: NOT AT ALL
SUM OF ALL RESPONSES TO PHQ9 QUESTIONS 1 & 2: 0
SUM OF ALL RESPONSES TO PHQ QUESTIONS 1-9: 0

## 2025-02-07 NOTE — PROGRESS NOTES
Assessment & Plan  1. Rash.  The patient reported a swollen tick on his inner thigh, which he attempted to remove. Subsequently, a rash developed in the area. He is not experiencing any systemic symptoms such as fevers, chills, or swollen joints. A prescription for doxycycline 100 mg, to be taken twice daily for a duration of 10 days, has been issued. The prescription will be sent to [facility] pharmacy. He was advised to avoid prolonged sun exposure while on this medication.          Chief Complaint   Patient presents with    Insect Bite     Tick bite on R thigh .. Patient states he pulled tick off about 3 weeks ago but the area is still very red .. Denies fevers, aches, pain or itching          No orders of the defined types were placed in this encounter.      Jacoby Prieto MD, FACP      History of Present Illness  The patient presents for evaluation of a rash.    He reports an incident where he discovered a swollen tick on his inner thigh upon awakening one morning. Despite his attempts to remove the tick, a rash subsequently developed over the following days. He is not experiencing any associated physical discomfort or systemic symptoms such as fevers, chills, or joint swelling.    SOCIAL HISTORY  The patient quit drinking in May 2024.           No Known Allergies    Outpatient Encounter Medications as of 2/7/2025   Medication Sig Dispense Refill    doxycycline hyclate (VIBRA-TABS) 100 MG tablet Take 1 tablet by mouth 2 times daily for 10 days 20 tablet 0    Cetirizine HCl 10 MG CAPS Take by mouth       No facility-administered encounter medications on file as of 2/7/2025.         Past Medical History:   Diagnosis Date    ADHD (attention deficit hyperactivity disorder) Childhood    Anxiety High school    Depression     Substance abuse (HCC) College         ROS:  Denies fever, chills, cough, chest pain, SOB,  nausea, vomiting, or diarrhea.  Denies wt loss, wt gain, hemoptysis, hematochezia or melena.    Physical

## 2025-02-07 NOTE — PROGRESS NOTES
Etienne Jiang is a 29 y.o. male presenting for/with:    Chief Complaint   Patient presents with    Insect Bite     Tick bite on R thigh .. Patient states he pulled tick off about 3 weeks ago but the area is still very red .. Denies fevers, aches, pain or itching        Vitals:    02/07/25 1406   BP: (!) 134/92   Site: Left Upper Arm   Position: Sitting   Pulse: 73   Resp: 16   Temp: 98.4 °F (36.9 °C)   TempSrc: Temporal   SpO2: 98%   Weight: 105.2 kg (232 lb)   Height: 1.88 m (6' 2\")       Pain Scale: 0 - No pain/10  Pain Location:     \"Have you been to the ER, urgent care clinic since your last visit?  Hospitalized since your last visit?\"    NO    “Have you seen or consulted any other health care providers outside of [facility] since your last visit?”    NO                 2/7/2025     2:05 PM   PHQ-9    Little interest or pleasure in doing things 0   Feeling down, depressed, or hopeless 0   PHQ-2 Score 0   PHQ-9 Total Score 0           5/7/2024     8:10 AM   [facility] AMB LEARNING ASSESSMENT   Primary Learner Patient   Primary Language ENGLISH   Learning Preference DEMONSTRATION   Answered By patient   Relationship to Learner SELF            5/7/2024     8:14 AM   Amb Fall Risk Assessment and TUG Test   Do you feel unsteady or are you worried about falling?  no   2 or more falls in past year? no   Fall with injury in past year? no           2/7/2025     2:00 PM 5/7/2024     8:00 AM   ADL ASSESSMENT   Feeding yourself No Help Needed No Help Needed   Getting from bed to chair No Help Needed No Help Needed   Getting dressed No Help Needed No Help Needed   Bathing or showering No Help Needed No Help Needed   Walk across the room (includes cane/walker) No Help Needed No Help Needed   Using the telphone No Help Needed No Help Needed   Taking your medications No Help Needed No Help Needed   Preparing meals No Help Needed No Help Needed   Managing money (expenses/bills) No Help Needed No Help Needed

## 2025-02-21 DIAGNOSIS — L30.9 ECZEMA, UNSPECIFIED TYPE: Primary | ICD-10-CM

## 2025-02-21 RX ORDER — TRIAMCINOLONE ACETONIDE 1 MG/G
CREAM TOPICAL
Qty: 60 G | Refills: 2 | Status: SHIPPED | OUTPATIENT
Start: 2025-02-21